# Patient Record
Sex: FEMALE | Race: WHITE | NOT HISPANIC OR LATINO | Employment: UNEMPLOYED | URBAN - METROPOLITAN AREA
[De-identification: names, ages, dates, MRNs, and addresses within clinical notes are randomized per-mention and may not be internally consistent; named-entity substitution may affect disease eponyms.]

---

## 2017-08-16 ENCOUNTER — HOSPITAL ENCOUNTER (OUTPATIENT)
Dept: OTHER | Facility: HOSPITAL | Age: 32
Discharge: HOME | End: 2017-08-16
Attending: FAMILY MEDICINE | Admitting: FAMILY MEDICINE

## 2017-08-28 ENCOUNTER — HOSPITAL ENCOUNTER (OUTPATIENT)
Dept: PHYSICAL THERAPY | Facility: HOSPITAL | Age: 32
Setting detail: RECURRING SERIES
Discharge: HOME | End: 2017-11-29
Attending: FAMILY MEDICINE | Admitting: FAMILY MEDICINE

## 2017-12-20 ENCOUNTER — HOSPITAL ENCOUNTER (OUTPATIENT)
Dept: OTHER | Facility: HOSPITAL | Age: 32
Discharge: HOME | End: 2017-12-20
Attending: FAMILY MEDICINE | Admitting: FAMILY MEDICINE

## 2018-07-18 ENCOUNTER — CONVERSION ENCOUNTER (OUTPATIENT)
Dept: CARDIOLOGY | Facility: CLINIC | Age: 33
End: 2018-07-18

## 2018-07-18 ENCOUNTER — HOSPITAL ENCOUNTER (OUTPATIENT)
Dept: ORTHOPEDIC SURGERY | Facility: CLINIC | Age: 33
Discharge: HOME | End: 2018-07-18
Attending: ORTHOPAEDIC SURGERY | Admitting: ORTHOPAEDIC SURGERY

## 2018-09-17 ENCOUNTER — CONVERSION ENCOUNTER (OUTPATIENT)
Dept: CARDIOLOGY | Facility: CLINIC | Age: 33
End: 2018-09-17

## 2018-09-21 ENCOUNTER — HOSPITAL ENCOUNTER (OUTPATIENT)
Dept: GENERAL RADIOLOGY | Facility: HOSPITAL | Age: 33
Discharge: HOME | End: 2018-09-21
Attending: FAMILY MEDICINE | Admitting: FAMILY MEDICINE

## 2018-10-10 ENCOUNTER — CONVERSION ENCOUNTER (OUTPATIENT)
Dept: CARDIOLOGY | Facility: CLINIC | Age: 33
End: 2018-10-10

## 2018-11-13 ENCOUNTER — CONVERSION ENCOUNTER (OUTPATIENT)
Dept: CARDIOLOGY | Facility: CLINIC | Age: 33
End: 2018-11-13

## 2019-06-04 VITALS
HEART RATE: 85 BPM | SYSTOLIC BLOOD PRESSURE: 137 MMHG | HEART RATE: 68 BPM | DIASTOLIC BLOOD PRESSURE: 82 MMHG | SYSTOLIC BLOOD PRESSURE: 148 MMHG | WEIGHT: 293 LBS | BODY MASS INDEX: 53.92 KG/M2 | SYSTOLIC BLOOD PRESSURE: 128 MMHG | HEART RATE: 71 BPM | BODY MASS INDEX: 53.92 KG/M2 | HEIGHT: 62 IN | WEIGHT: 293 LBS | WEIGHT: 293 LBS | DIASTOLIC BLOOD PRESSURE: 83 MMHG | WEIGHT: 293 LBS | RESPIRATION RATE: 18 BRPM | HEIGHT: 62 IN | HEART RATE: 84 BPM | DIASTOLIC BLOOD PRESSURE: 85 MMHG | SYSTOLIC BLOOD PRESSURE: 163 MMHG | BODY MASS INDEX: 62.22 KG/M2 | DIASTOLIC BLOOD PRESSURE: 107 MMHG | BODY MASS INDEX: 53.92 KG/M2 | HEIGHT: 62 IN

## 2021-08-09 PROBLEM — J45.909 REACTIVE AIRWAY DISEASE: Status: ACTIVE | Noted: 2018-07-18

## 2021-08-09 PROBLEM — M72.2 PLANTAR FASCIITIS: Status: ACTIVE | Noted: 2021-08-09

## 2021-08-09 PROBLEM — I10 HYPERTENSION, BENIGN: Status: ACTIVE | Noted: 2021-08-09

## 2021-08-09 PROBLEM — M54.17 LUMBOSACRAL RADICULOPATHY: Status: ACTIVE | Noted: 2018-07-18

## 2021-08-09 PROBLEM — D64.9 ANEMIA: Status: ACTIVE | Noted: 2021-08-09

## 2021-08-09 PROBLEM — M51.16 LUMBAR DISC HERNIATION WITH RADICULOPATHY: Status: ACTIVE | Noted: 2018-07-18

## 2021-08-09 PROBLEM — K21.9 GASTROESOPHAGEAL REFLUX DISEASE, ESOPHAGITIS PRESENCE NOT SPECIFIED: Status: ACTIVE | Noted: 2021-08-09

## 2021-08-09 PROBLEM — J20.8 ACUTE BRONCHITIS DUE TO OTHER SPECIFIED ORGANISMS: Status: ACTIVE | Noted: 2021-08-09

## 2021-08-09 PROBLEM — R60.0 LOWER EXTREMITY EDEMA: Status: ACTIVE | Noted: 2021-08-09

## 2021-08-09 PROBLEM — J45.998 UNCONTROLLED PERSISTENT ASTHMA: Status: ACTIVE | Noted: 2021-08-09

## 2021-08-09 PROBLEM — R00.2 PALPITATIONS: Status: ACTIVE | Noted: 2018-10-10

## 2021-08-09 PROBLEM — F32.A DEPRESSIVE DISORDER: Status: ACTIVE | Noted: 2021-08-09

## 2021-08-09 PROBLEM — M54.41 CHRONIC RIGHT-SIDED LOW BACK PAIN WITH RIGHT-SIDED SCIATICA: Status: ACTIVE | Noted: 2018-07-18

## 2021-08-09 PROBLEM — M54.6 ACUTE RIGHT-SIDED THORACIC BACK PAIN: Status: ACTIVE | Noted: 2021-08-09

## 2021-08-09 PROBLEM — R12 HEARTBURN: Status: ACTIVE | Noted: 2018-12-10

## 2021-08-09 PROBLEM — M19.90 OSTEOARTHRITIS: Status: ACTIVE | Noted: 2018-07-18

## 2021-08-09 PROBLEM — G89.29 CHRONIC RIGHT-SIDED LOW BACK PAIN WITH RIGHT-SIDED SCIATICA: Status: ACTIVE | Noted: 2018-07-18

## 2021-08-09 PROBLEM — F41.9 ANXIETY: Status: ACTIVE | Noted: 2021-08-09

## 2021-08-10 ENCOUNTER — OFFICE VISIT (OUTPATIENT)
Dept: FAMILY MEDICINE CLINIC | Facility: CLINIC | Age: 36
End: 2021-08-10

## 2021-08-10 VITALS
OXYGEN SATURATION: 98 % | BODY MASS INDEX: 53.92 KG/M2 | HEART RATE: 98 BPM | RESPIRATION RATE: 17 BRPM | WEIGHT: 293 LBS | SYSTOLIC BLOOD PRESSURE: 130 MMHG | DIASTOLIC BLOOD PRESSURE: 80 MMHG | TEMPERATURE: 97.1 F | HEIGHT: 62 IN

## 2021-08-10 DIAGNOSIS — I10 HYPERTENSION, BENIGN: ICD-10-CM

## 2021-08-10 DIAGNOSIS — F33.1 MODERATE EPISODE OF RECURRENT MAJOR DEPRESSIVE DISORDER (HCC): Primary | ICD-10-CM

## 2021-08-10 DIAGNOSIS — F41.9 ANXIETY: ICD-10-CM

## 2021-08-10 DIAGNOSIS — R61 EXCESSIVE SWEATING: ICD-10-CM

## 2021-08-10 DIAGNOSIS — Z13.220 SCREENING FOR HYPERLIPIDEMIA: ICD-10-CM

## 2021-08-10 PROCEDURE — 99214 OFFICE O/P EST MOD 30 MIN: CPT | Performed by: FAMILY MEDICINE

## 2021-08-10 RX ORDER — HYDROCHLOROTHIAZIDE 25 MG/1
25 TABLET ORAL DAILY
COMMUNITY
Start: 2021-07-29 | End: 2021-08-10

## 2021-08-10 RX ORDER — LANOLIN ALCOHOL/MO/W.PET/CERES
1000 CREAM (GRAM) TOPICAL DAILY
COMMUNITY

## 2021-08-10 RX ORDER — BUPROPION HYDROCHLORIDE 300 MG/1
300 TABLET ORAL DAILY
COMMUNITY
Start: 2021-07-29 | End: 2021-08-10

## 2021-08-10 RX ORDER — SERTRALINE HYDROCHLORIDE 100 MG/1
100 TABLET, FILM COATED ORAL DAILY
Qty: 30 TABLET | Refills: 2 | Status: SHIPPED | OUTPATIENT
Start: 2021-08-10 | End: 2021-11-10

## 2021-08-10 RX ORDER — LISINOPRIL 10 MG/1
10 TABLET ORAL DAILY
Qty: 30 TABLET | Refills: 12 | Status: SHIPPED | OUTPATIENT
Start: 2021-08-10 | End: 2022-10-20

## 2021-08-10 NOTE — PATIENT INSTRUCTIONS
Major Depressive Disorder, Adult  Major depressive disorder (MDD) is a mental health condition. It may also be called clinical depression or unipolar depression. MDD causes symptoms of sadness, hopelessness, and loss of interest in things. These symptoms last most of the day, almost every day, for 2 weeks. MDD can also cause physical symptoms. It can interfere with relationships and with everyday activities, such as work, school, and activities that are usually pleasant.  MDD may be mild, moderate, or severe. It may be single-episode MDD, which happens once, or recurrent MDD, which may occur multiple times.  What are the causes?  The exact cause of this condition is not known. MDD is most likely caused by a combination of things, which may include:  · Your personality traits.  · T[place]man or conditioned behaviors or thoughts or feelings that reinforce negativity.  · Any alcohol or substance misuse.  · Long-term (chronic) physical or mental health illness.  · Going through a traumatic experience or major life changes.  What increases the risk?  The following factors may make someone more likely to develop MDD:  · A family history of depression.  · Being a woman.  · Troubled family relationships.  · Abnormally low levels of certain brain chemicals.  · Traumatic or painful events in childhood, especially abuse or loss of a parent.  · A lot of stress from life experiences, such as poor living conditions or discrimination.  · Chronic physical illness or other mental health disorders.  What are the signs or symptoms?  The main symptoms of MDD usually include:  · Constant depressed or irritable mood.  · A loss of interest in things and activities.  Other symptoms include:  · Sleeping or eating too much or too little.  · Unexplained weight gain or weight loss.  · Tiredness or low energy.  · Being agitated, restless, or weak.  · Feeling hopeless, worthless, or guilty.  · Trouble thinking clearly or making  decisions.  · Thoughts of suicide or thoughts of harming others.  · Isolating oneself or avoiding other people or activities.  · Trouble completing tasks, work, or any normal obligations.  Severe symptoms of this condition may include:  · Psychotic depression.This may include false beliefs, or delusions. It may also include seeing, hearing, tasting, smelling, or feeling things that are not real (hallucinations).  · Chronic depression or persistent depressive disorder. This is low-level depression that lasts for at least 2 years.  · Melancholic depression, or feeling extremely sad and hopeless.  · Catatonic depression, which includes trouble speaking and trouble moving.  How is this diagnosed?  This condition may be diagnosed based on:  · Your symptoms.  · Your medical and mental health history. You may be asked questions about your lifestyle, including any drug and alcohol use.  · A physical exam.  · Blood tests to rule out other conditions.  MDD is confirmed if you have the following symptoms most of the day, nearly every day, in a 2-week period:  · Either a depressed mood or loss of interest.  · At least four other MDD symptoms.  How is this treated?  This condition is usually treated by mental health professionals, such as psychologists, psychiatrists, and clinical social workers. You may need more than one type of treatment. Treatment may include:  · Psychotherapy, also called talk therapy or counseling. Types of psychotherapy include:  ? Cognitive behavioral therapy (CBT). This teaches you to recognize unhealthy feelings, thoughts, and behaviors, and replace them with positive thoughts and actions.  ? Interpersonal therapy (IPT). This helps you to improve the way you communicate with others or relate to them.  ? Family therapy. This treatment includes members of your family.  · Medicines to treat anxiety and depression. These medicines help to balance the brain chemicals that affect your emotions.  · Lifestyle  changes. You may be asked to:  ? Limit alcohol use and avoid drug use.  ? Get regular exercise.  ? Get plenty of sleep.  ? Make healthy eating choices.  ? Spend more time outdoors.  · Brain stimulation. This may be done if symptoms are very severe and other treatments have not worked. Examples of this treatment are electroconvulsive therapy and transcranial magnetic stimulation.  Follow these instructions at home:  Activity  · Exercise regularly and spend time outdoors.  · Find activities that you enjoy doing, and make time to do them.  · Find healthy ways to manage stress, such as:  ? Meditation or deep breathing.  ? Spending time in nature.  ? Journaling.  · Return to your normal activities as told by your health care provider. Ask your health care provider what activities are safe for you.  Alcohol and drug use  · If you drink alcohol:  ? Limit how much you use to:  § 0-1 drink a day for women who are not pregnant.  § 0-2 drinks a day for men.  ? Be aware of how much alcohol is in your drink. In the [place] one drink equals one 12 oz bottle of beer (355 mL), one 5 oz glass of wine (148 mL), or one 1½ oz glass of hard liquor (44 mL).  ? Discuss your alcohol use with your health care provider. Alcohol can affect any antidepressant medicines you are taking.  · Discuss any drug use with your health care provider.  General instructions    · Take over-the-counter and prescription medicines only as told by your health care provider.  · Eat a healthy diet and get plenty of sleep.  · Consider joining a support group. Your health care provider may be able to recommend one.  · Keep all follow-up visits as told by your health care provider. This is important.  Where to find more information  · National [place] on Mental Illness: www.nir.org  · [place] of [place] Health: www.nimh.nih.gov  Contact a health care provider if:  · Your symptoms get worse.  · You develop new symptoms.  Get help right away if:  · You  self-harm.  · You have serious thoughts about hurting yourself or others.  · You hallucinate.  If you ever feel like you may hurt yourself or others, or have thoughts about taking your own life, get help right away. Go to your nearest emergency department or:  · Call your local emergency services (911 in the [place]  · Call a suicide crisis helpline, such as the National Suicide Prevention Lifeline at 1-294.465.5636. This is open 24 hours a day in the [place]  · Text the Crisis Text Line at 355893 (in the [place]  Summary  · Major depressive disorder (MDD) is a mental health condition. MDD causes symptoms of sadness, hopelessness, and loss of interest in things. These symptoms last most of the day, almost every day, for 2 weeks.  · The symptoms of MDD can interfere with relationships and with everyday activities.  · Treatments and support are available for people who develop MDD. You may need more than one type of treatment.  · Get help right away if you have serious thoughts about hurting yourself or others.  This information is not intended to replace advice given to you by your health care provider. Make sure you discuss any questions you have with your health care provider.  Document Revised: 11/28/2020 Document Reviewed: 11/28/2020  [employer] Patient Education © 2021 [employer]

## 2021-08-10 NOTE — PROGRESS NOTES
Subjective   Yoselin Gerard is a 36 y.o. female.     Chief Complaint   Patient presents with   • Excessive Sweating   • Anxiety       Anxiety  Presents for follow-up visit. Symptoms include chest pain, depressed mood, excessive worry and nervous/anxious behavior. Patient reports no confusion. Symptoms occur most days. The severity of symptoms is severe and interfering with daily activities. The quality of sleep is poor. Nighttime awakenings: several.     Compliance with medications is %.   Hypertension  This is a chronic problem. Associated symptoms include anxiety and chest pain. Risk factors for coronary artery disease include family history and obesity. Compliance problems include exercise and diet.      I personally reviewed and updated the patient's allergies, medications, problem list, and past medical, surgical, social, and family history. I have reviewed and confirmed the accuracy of the History of Present Illness and Review of Symptoms as documented by the MA/LPN/RN. Josefa Skelton MD    Allergies:  Allergies   Allergen Reactions   • Penicillins Rash and Swelling   • Hydrocodone Other (See Comments)     Numbness       Social History:  Social History     Socioeconomic History   • Marital status:      Spouse name: Not on file   • Number of children: Not on file   • Years of education: Not on file   • Highest education level: Not on file   Tobacco Use   • Smoking status: Never Smoker   • Smokeless tobacco: Never Used   Substance and Sexual Activity   • Alcohol use: Not Currently   • Drug use: Defer   • Sexual activity: Defer       Family History:  Family History   Problem Relation Age of Onset   • Heart disease Mother    • Heart disease Maternal Grandfather        Past Medical History :  Patient Active Problem List   Diagnosis   • Acute bronchitis due to other specified organisms   • Acute right-sided thoracic back pain   • Anemia   • Anxiety   • Reactive airway disease   • Moderate episode of  recurrent major depressive disorder (CMS/HCC)   • Gastroesophageal reflux disease, esophagitis presence not specified   • Heartburn   • Hypertension, benign   • Uncontrolled persistent asthma   • Plantar fasciitis   • Palpitations   • Osteoarthritis   • Lumbosacral radiculopathy   • Lumbar disc herniation with radiculopathy   • Lower extremity edema   • Chronic right-sided low back pain with right-sided sciatica   • Excessive sweating   • Screening for hyperlipidemia       Medication List:    Current Outpatient Medications:   •  ASHWAGANDHA PO, Take 3,000 mg by mouth., Disp: , Rfl:   •  thiamine (VITAMIN B-1) 100 MG tablet  tablet, Take 100 mg by mouth Daily., Disp: , Rfl:   •  vitamin B-12 (CYANOCOBALAMIN) 1000 MCG tablet, Take 1,000 mcg by mouth Daily., Disp: , Rfl:   •  lisinopril (PRINIVIL,ZESTRIL) 10 MG tablet, Take 1 tablet by mouth Daily., Disp: 30 tablet, Rfl: 12  •  sertraline (Zoloft) 100 MG tablet, Take 1 tablet by mouth Daily., Disp: 30 tablet, Rfl: 2    Past Surgical History:  Past Surgical History:   Procedure Laterality Date   • CARPAL TUNNEL RELEASE     •  SECTION      3   • TUBAL ABDOMINAL LIGATION         Review of Systems:  Review of Systems   Constitutional: Negative for activity change and fever.   HENT: Negative for ear pain, rhinorrhea, sinus pressure and voice change.    Eyes: Negative for visual disturbance.   Respiratory: Negative for cough and shortness of breath.    Cardiovascular: Negative for chest pain.   Gastrointestinal: Negative for abdominal pain, diarrhea, nausea and vomiting.   Endocrine: Negative for cold intolerance and heat intolerance.   Genitourinary: Negative for frequency and urgency.   Musculoskeletal: Negative for arthralgias.   Skin: Negative for rash.   Neurological: Negative for syncope.   Hematological: Does not bruise/bleed easily.   Psychiatric/Behavioral: Negative for depressed mood. The patient is not nervous/anxious.        Physical Exam:  Vital  "Signs:  Vital Signs:   /80   Pulse 98   Temp 97.1 °F (36.2 °C)   Resp 17   Ht 157.5 cm (62\")   Wt (!) 154 kg (339 lb 6.4 oz)   SpO2 98%   BMI 62.08 kg/m²     Result Review :                Physical Exam  Vitals reviewed.   Constitutional:       Appearance: Normal appearance. She is well-developed.   HENT:      Head: Normocephalic and atraumatic.   Cardiovascular:      Rate and Rhythm: Normal rate and regular rhythm.      Heart sounds: Normal heart sounds. No murmur heard.   No friction rub. No gallop.    Pulmonary:      Effort: Pulmonary effort is normal.      Breath sounds: Normal breath sounds. No wheezing or rales.   Skin:     General: Skin is warm and dry.   Neurological:      Mental Status: She is alert and oriented to person, place, and time.      Coordination: Coordination normal.      Gait: Gait normal.   Psychiatric:         Behavior: Behavior is cooperative.         Assessment and Plan:  Problems Addressed this Visit        Cardiac and Vasculature    Hypertension, benign     Hypertension is unchanged.  Continue current treatment regimen.  Dietary sodium restriction.  Weight loss.  Blood pressure will be reassessed in 3 months.         Relevant Medications    lisinopril (PRINIVIL,ZESTRIL) 10 MG tablet    Other Relevant Orders    CBC & Differential (Completed)    Comprehensive Metabolic Panel (Completed)    TSH (Completed)    Screening for hyperlipidemia    Relevant Orders    Lipid Panel With / Chol / HDL Ratio (Completed)       Mental Health    Anxiety    Relevant Medications    sertraline (Zoloft) 100 MG tablet    Moderate episode of recurrent major depressive disorder (CMS/HCC) - Primary     Patient's depression is recurrent and is moderate without psychosis. Their depression is currently active and the condition is worsening. This will be reassessed in 4 weeks. F/U as described:patient was prescribed an antidepressant medicine.         Relevant Medications    sertraline (Zoloft) 100 MG " tablet       Symptoms and Signs    Excessive sweating     Check labs           Diagnoses       Codes Comments    Moderate episode of recurrent major depressive disorder (CMS/HCC)    -  Primary ICD-10-CM: F33.1  ICD-9-CM: 296.32     Anxiety     ICD-10-CM: F41.9  ICD-9-CM: 300.00     Excessive sweating     ICD-10-CM: R61  ICD-9-CM: 780.8     Hypertension, benign     ICD-10-CM: I10  ICD-9-CM: 401.1     Screening for hyperlipidemia     ICD-10-CM: Z13.220  ICD-9-CM: V77.91            An After Visit Summary and PPPS were given to the patient.         I wore protective equipment throughout this patient encounter to include mask. Hand hygiene was performed before donning protective equipment and after removal when leaving the room.

## 2021-08-11 LAB
ALBUMIN SERPL-MCNC: 4.2 G/DL (ref 3.8–4.8)
ALBUMIN/GLOB SERPL: 1.4 {RATIO} (ref 1.2–2.2)
ALP SERPL-CCNC: 69 IU/L (ref 48–121)
ALT SERPL-CCNC: 26 IU/L (ref 0–32)
AST SERPL-CCNC: 22 IU/L (ref 0–40)
BASOPHILS # BLD AUTO: 0.1 X10E3/UL (ref 0–0.2)
BASOPHILS NFR BLD AUTO: 1 %
BILIRUB SERPL-MCNC: 0.3 MG/DL (ref 0–1.2)
BUN SERPL-MCNC: 15 MG/DL (ref 6–20)
BUN/CREAT SERPL: 20 (ref 9–23)
CALCIUM SERPL-MCNC: 10.1 MG/DL (ref 8.7–10.2)
CHLORIDE SERPL-SCNC: 99 MMOL/L (ref 96–106)
CHOLEST SERPL-MCNC: 223 MG/DL (ref 100–199)
CHOLEST/HDLC SERPL: 3.4 RATIO (ref 0–4.4)
CO2 SERPL-SCNC: 24 MMOL/L (ref 20–29)
CREAT SERPL-MCNC: 0.76 MG/DL (ref 0.57–1)
EOSINOPHIL # BLD AUTO: 0.2 X10E3/UL (ref 0–0.4)
EOSINOPHIL NFR BLD AUTO: 2 %
ERYTHROCYTE [DISTWIDTH] IN BLOOD BY AUTOMATED COUNT: 14.4 % (ref 11.7–15.4)
GLOBULIN SER CALC-MCNC: 2.9 G/DL (ref 1.5–4.5)
GLUCOSE SERPL-MCNC: 107 MG/DL (ref 65–99)
HCT VFR BLD AUTO: 43.1 % (ref 34–46.6)
HDLC SERPL-MCNC: 66 MG/DL
HGB BLD-MCNC: 13.7 G/DL (ref 11.1–15.9)
IMM GRANULOCYTES # BLD AUTO: 0 X10E3/UL (ref 0–0.1)
IMM GRANULOCYTES NFR BLD AUTO: 0 %
LDLC SERPL CALC-MCNC: 132 MG/DL (ref 0–99)
LYMPHOCYTES # BLD AUTO: 3.4 X10E3/UL (ref 0.7–3.1)
LYMPHOCYTES NFR BLD AUTO: 32 %
MCH RBC QN AUTO: 27.1 PG (ref 26.6–33)
MCHC RBC AUTO-ENTMCNC: 31.8 G/DL (ref 31.5–35.7)
MCV RBC AUTO: 85 FL (ref 79–97)
MONOCYTES # BLD AUTO: 0.8 X10E3/UL (ref 0.1–0.9)
MONOCYTES NFR BLD AUTO: 8 %
NEUTROPHILS # BLD AUTO: 6 X10E3/UL (ref 1.4–7)
NEUTROPHILS NFR BLD AUTO: 57 %
PLATELET # BLD AUTO: 308 X10E3/UL (ref 150–450)
POTASSIUM SERPL-SCNC: 3.8 MMOL/L (ref 3.5–5.2)
PROT SERPL-MCNC: 7.1 G/DL (ref 6–8.5)
RBC # BLD AUTO: 5.05 X10E6/UL (ref 3.77–5.28)
SODIUM SERPL-SCNC: 140 MMOL/L (ref 134–144)
TRIGL SERPL-MCNC: 143 MG/DL (ref 0–149)
TSH SERPL DL<=0.005 MIU/L-ACNC: 5.97 UIU/ML (ref 0.45–4.5)
VLDLC SERPL CALC-MCNC: 25 MG/DL (ref 5–40)
WBC # BLD AUTO: 10.5 X10E3/UL (ref 3.4–10.8)

## 2021-08-24 ENCOUNTER — OFFICE VISIT (OUTPATIENT)
Dept: FAMILY MEDICINE CLINIC | Facility: CLINIC | Age: 36
End: 2021-08-24

## 2021-08-24 VITALS
OXYGEN SATURATION: 95 % | TEMPERATURE: 97.2 F | HEART RATE: 86 BPM | BODY MASS INDEX: 53.92 KG/M2 | WEIGHT: 293 LBS | RESPIRATION RATE: 18 BRPM | HEIGHT: 62 IN | SYSTOLIC BLOOD PRESSURE: 134 MMHG | DIASTOLIC BLOOD PRESSURE: 86 MMHG

## 2021-08-24 DIAGNOSIS — I10 HYPERTENSION, BENIGN: ICD-10-CM

## 2021-08-24 DIAGNOSIS — Z00.00 ENCOUNTER FOR WELLNESS EXAMINATION: Primary | ICD-10-CM

## 2021-08-24 LAB
BILIRUB BLD-MCNC: NEGATIVE MG/DL
CLARITY, POC: CLEAR
COLOR UR: ABNORMAL
GLUCOSE UR STRIP-MCNC: NEGATIVE MG/DL
KETONES UR QL: NEGATIVE
LEUKOCYTE EST, POC: NEGATIVE
NITRITE UR-MCNC: NEGATIVE MG/ML
PH UR: 5 [PH] (ref 5–8)
PROT UR STRIP-MCNC: ABNORMAL MG/DL
RBC # UR STRIP: ABNORMAL /UL
SP GR UR: 1.02 (ref 1–1.03)
UROBILINOGEN UR QL: NORMAL

## 2021-08-24 PROCEDURE — 81003 URINALYSIS AUTO W/O SCOPE: CPT | Performed by: FAMILY MEDICINE

## 2021-08-24 PROCEDURE — 99395 PREV VISIT EST AGE 18-39: CPT | Performed by: FAMILY MEDICINE

## 2021-08-24 NOTE — PROGRESS NOTES
"  Chief Complaint   Patient presents with   • Annual Exam   • Hypertension         Subjective   Yoselin Gerard is a 36 y.o. female here for a Well Woman Visit. Energy level is described as poor and she is sleeping poorly. She sleeps 4 hours nightly. Last pap was patient does not remember but is scheduled for 08/31/2021 with Dr Carroll. Contraception: tubal. Patient exercises irregularly. Nutrition is described as in general, an \"unhealthy\" diet. Her   libido is normal. She reports that she does perform monthly self breast exam.      Hypertension  This is a chronic problem. The current episode started more than 1 year ago. The problem has been waxing and waning since onset. The problem is controlled. Pertinent negatives include no blurred vision, chest pain, headaches, malaise/fatigue, palpitations, shortness of breath or sweats. There are no associated agents to hypertension. Risk factors for coronary artery disease include obesity. Past treatments include nothing. Current antihypertension treatment includes ACE inhibitors. The current treatment provides moderate improvement.        I personally reviewed and updated the patient's allergies, medications, problem list, and past medical, surgical, social, and family history.     Allergies:  Allergies   Allergen Reactions   • Penicillins Rash and Swelling   • Hydrocodone Other (See Comments)     Numbness       Social History:  Social History     Socioeconomic History   • Marital status:      Spouse name: Not on file   • Number of children: Not on file   • Years of education: Not on file   • Highest education level: Not on file   Tobacco Use   • Smoking status: Never Smoker   • Smokeless tobacco: Never Used   Substance and Sexual Activity   • Alcohol use: Not Currently   • Drug use: Defer   • Sexual activity: Defer       Family History:  Family History   Problem Relation Age of Onset   • Heart disease Mother    • Heart disease Maternal Grandfather        Past " Medical History :  Active Ambulatory Problems     Diagnosis Date Noted   • Acute bronchitis due to other specified organisms 2021   • Acute right-sided thoracic back pain 2021   • Anemia 2021   • Anxiety 2021   • Reactive airway disease 2018   • Moderate episode of recurrent major depressive disorder (CMS/HCC) 2021   • Gastroesophageal reflux disease, esophagitis presence not specified 2021   • Heartburn 12/10/2018   • Hypertension, benign 2021   • Uncontrolled persistent asthma 2021   • Plantar fasciitis 2021   • Palpitations 10/10/2018   • Osteoarthritis 2018   • Lumbosacral radiculopathy 2018   • Lumbar disc herniation with radiculopathy 2018   • Lower extremity edema 2021   • Chronic right-sided low back pain with right-sided sciatica 2018   • Excessive sweating 08/10/2021   • Encounter for wellness examination 08/10/2021     Resolved Ambulatory Problems     Diagnosis Date Noted   • No Resolved Ambulatory Problems     Past Medical History:   Diagnosis Date   • Hypertension    • PTSD (post-traumatic stress disorder)        Medication List:    Current Outpatient Medications:   •  ASHWAGANDHA PO, Take 3,000 mg by mouth., Disp: , Rfl:   •  lisinopril (PRINIVIL,ZESTRIL) 10 MG tablet, Take 1 tablet by mouth Daily., Disp: 30 tablet, Rfl: 12  •  sertraline (Zoloft) 100 MG tablet, Take 1 tablet by mouth Daily., Disp: 30 tablet, Rfl: 2  •  thiamine (VITAMIN B-1) 100 MG tablet  tablet, Take 100 mg by mouth Daily., Disp: , Rfl:   •  vitamin B-12 (CYANOCOBALAMIN) 1000 MCG tablet, Take 1,000 mcg by mouth Daily., Disp: , Rfl:     Past Surgical History:  Past Surgical History:   Procedure Laterality Date   • CARPAL TUNNEL RELEASE     •  SECTION      3   • TUBAL ABDOMINAL LIGATION         Depression Screen:   PHQ-2/PHQ-9 Depression Screening 8/10/2021   Little interest or pleasure in doing things 1   Feeling down, depressed, or  "hopeless 1   Trouble falling or staying asleep, or sleeping too much 3   Feeling tired or having little energy 3   Poor appetite or overeating 3   Feeling bad about yourself - or that you are a failure or have let yourself or your family down 1   Trouble concentrating on things, such as reading the newspaper or watching television 3   Moving or speaking so slowly that other people could have noticed. Or the opposite - being so fidgety or restless that you have been moving around a lot more than usual 2   Thoughts that you would be better off dead, or of hurting yourself in some way 0   Total Score 17   If you checked off any problems, how difficult have these problems made it for you to do your work, take care of things at home, or get along with other people? Very difficult       Fall Risk Screen:  YING Fall Risk Assessment has not been completed.    Review Of Systems:  Review of Systems   Constitutional: Negative for activity change, fever and malaise/fatigue.   HENT: Negative for ear pain, rhinorrhea, sinus pressure and voice change.    Eyes: Negative for blurred vision and visual disturbance.   Respiratory: Negative for cough and shortness of breath.    Cardiovascular: Negative for chest pain and palpitations.   Gastrointestinal: Negative for abdominal pain, diarrhea, nausea and vomiting.   Endocrine: Negative for cold intolerance and heat intolerance.   Genitourinary: Negative for frequency and urgency.   Musculoskeletal: Negative for arthralgias.   Skin: Negative for rash.   Neurological: Negative for syncope.   Hematological: Does not bruise/bleed easily.   Psychiatric/Behavioral: Negative for depressed mood. The patient is not nervous/anxious.        Physical Exam:  Vital Signs:  Visit Vitals  /86   Pulse 86   Temp 97.2 °F (36.2 °C)   Resp 18   Ht 157.5 cm (62\")   Wt (!) 150 kg (330 lb 6.4 oz)   SpO2 95%   BMI 60.43 kg/m²       Physical Exam  Vitals and nursing note reviewed.   Constitutional:       " General: She is not in acute distress.     Appearance: She is well-developed. She is not diaphoretic.   HENT:      Head: Normocephalic and atraumatic.      Right Ear: External ear normal.      Left Ear: External ear normal.      Nose: Nose normal.      Mouth/Throat:      Pharynx: No oropharyngeal exudate.   Eyes:      General: No scleral icterus.        Right eye: No discharge.         Left eye: No discharge.      Conjunctiva/sclera: Conjunctivae normal.      Pupils: Pupils are equal, round, and reactive to light.   Neck:      Thyroid: No thyromegaly.      Trachea: No tracheal deviation.   Cardiovascular:      Rate and Rhythm: Normal rate and regular rhythm.      Heart sounds: Normal heart sounds. No murmur heard.   No friction rub. No gallop.    Pulmonary:      Effort: Pulmonary effort is normal. No respiratory distress.      Breath sounds: Normal breath sounds. No stridor. No wheezing or rales.   Abdominal:      General: Bowel sounds are normal. There is no distension.      Palpations: Abdomen is soft. There is no mass.      Tenderness: There is no abdominal tenderness. There is no guarding or rebound.   Musculoskeletal:         General: No tenderness or deformity. Normal range of motion.      Cervical back: Normal range of motion and neck supple.   Lymphadenopathy:      Cervical: No cervical adenopathy.   Skin:     General: Skin is warm and dry.      Capillary Refill: Capillary refill takes less than 2 seconds.      Coloration: Skin is not pale.      Findings: No erythema or rash.   Neurological:      Mental Status: She is alert and oriented to person, place, and time.      Cranial Nerves: No cranial nerve deficit.      Sensory: No sensory deficit.      Motor: No tremor, atrophy or abnormal muscle tone.      Coordination: Coordination normal.      Gait: Gait normal.      Deep Tendon Reflexes: Reflexes are normal and symmetric. Reflexes normal.   Psychiatric:         Behavior: Behavior normal.         Thought  Content: Thought content normal.         Cognition and Memory: Memory is not impaired. She does not exhibit impaired recent memory or impaired remote memory.         Judgment: Judgment normal.           Assessment and Plan:  Problem List Items Addressed This Visit        Cardiac and Vasculature    Hypertension, benign    Current Assessment & Plan     Hypertension is unchanged.  Continue current treatment regimen.  Dietary sodium restriction.  Weight loss.  Blood pressure will be reassessed in 3 months.            Health Encounters    Encounter for wellness examination - Primary    Relevant Orders    POCT urinalysis dipstick, multipro (Completed)          An After Visit Summary and PPPS were given to the patient.       Discussed injury prevention, diet and exercise, safe sexual practices, and screening for common diseases. Encouraged use of sunscreen and seatbelts. Discussed timing of  cervical cancer screening. Encouraged monthly self-breast exams, yearly clinical breast exams, and discussed timing of mammograms. Avoidance of tobacco encouraged. Limitation or avoidance of alcohol encouraged. Recommend yearly dental and eye exams. Also discussed monitoring of blood pressure, lipids.     I wore protective equipment throughout this patient encounter to include mask. Hand hygiene was performed before donning protective equipment and after removal when leaving the room.

## 2021-11-10 DIAGNOSIS — F33.1 MODERATE EPISODE OF RECURRENT MAJOR DEPRESSIVE DISORDER (HCC): ICD-10-CM

## 2021-11-10 DIAGNOSIS — F41.9 ANXIETY: ICD-10-CM

## 2021-11-10 RX ORDER — SERTRALINE HYDROCHLORIDE 100 MG/1
TABLET, FILM COATED ORAL
Qty: 30 TABLET | Refills: 2 | Status: SHIPPED | OUTPATIENT
Start: 2021-11-10 | End: 2022-03-11

## 2021-11-29 ENCOUNTER — TELEPHONE (OUTPATIENT)
Dept: FAMILY MEDICINE CLINIC | Facility: CLINIC | Age: 36
End: 2021-11-29

## 2021-12-07 ENCOUNTER — OFFICE VISIT (OUTPATIENT)
Dept: FAMILY MEDICINE CLINIC | Facility: CLINIC | Age: 36
End: 2021-12-07

## 2021-12-07 VITALS
SYSTOLIC BLOOD PRESSURE: 124 MMHG | BODY MASS INDEX: 53.92 KG/M2 | RESPIRATION RATE: 18 BRPM | TEMPERATURE: 97.3 F | WEIGHT: 293 LBS | HEIGHT: 62 IN | OXYGEN SATURATION: 97 % | HEART RATE: 104 BPM | DIASTOLIC BLOOD PRESSURE: 72 MMHG

## 2021-12-07 DIAGNOSIS — I10 HYPERTENSION, BENIGN: Primary | ICD-10-CM

## 2021-12-07 PROCEDURE — 99212 OFFICE O/P EST SF 10 MIN: CPT | Performed by: FAMILY MEDICINE

## 2021-12-07 NOTE — PROGRESS NOTES
Subjective   Yoselin Gerard is a 36 y.o. female. Presents to [facility]    Chief Complaint   Patient presents with   • Hypertension       Hypertension  This is a chronic problem. The current episode started more than 1 year ago. The problem has been waxing and waning since onset. The problem is controlled. Pertinent negatives include no blurred vision, chest pain, headaches, malaise/fatigue, neck pain, palpitations, shortness of breath or sweats. There are no associated agents to hypertension. Risk factors for coronary artery disease include obesity. Past treatments include nothing. Current antihypertension treatment includes ACE inhibitors. The current treatment provides moderate improvement.        I personally reviewed and updated the patient's allergies, medications, problem list, and past medical, surgical, social, and family history. I have reviewed and confirmed the accuracy of the History of Present Illness and Review of Symptoms as documented by the MA/LPN/RN. Josefa Skelton MD    Allergies:  Allergies   Allergen Reactions   • Penicillins Rash and Swelling   • Hydrocodone Other (See Comments)     Numbness       Social History:  Social History     Socioeconomic History   • Marital status:    Tobacco Use   • Smoking status: Never Smoker   • Smokeless tobacco: Never Used   Substance and Sexual Activity   • Alcohol use: Not Currently   • Drug use: Defer   • Sexual activity: Defer       Family History:  Family History   Problem Relation Age of Onset   • Heart disease Mother    • Heart disease Maternal Grandfather        Past Medical History :  Patient Active Problem List   Diagnosis   • Acute bronchitis due to other specified organisms   • Acute right-sided thoracic back pain   • Anemia   • Anxiety   • Reactive airway disease   • Moderate episode of recurrent major depressive disorder (HCC)   • Gastroesophageal reflux disease, esophagitis presence not specified   • Heartburn   •  Hypertension, benign   • Uncontrolled persistent asthma   • Plantar fasciitis   • Palpitations   • Osteoarthritis   • Lumbosacral radiculopathy   • Lumbar disc herniation with radiculopathy   • Lower extremity edema   • Chronic right-sided low back pain with right-sided sciatica   • Excessive sweating   • Encounter for wellness examination       Medication List:    Current Outpatient Medications:   •  ASHWAGANDHA PO, Take 3,000 mg by mouth., Disp: , Rfl:   •  lisinopril (PRINIVIL,ZESTRIL) 10 MG tablet, Take 1 tablet by mouth Daily., Disp: 30 tablet, Rfl: 12  •  sertraline (ZOLOFT) 100 MG tablet, TAKE 1 TABLET BY MOUTH EVERY DAY, Disp: 30 tablet, Rfl: 2  •  thiamine (VITAMIN B-1) 100 MG tablet  tablet, Take 100 mg by mouth Daily., Disp: , Rfl:   •  vitamin B-12 (CYANOCOBALAMIN) 1000 MCG tablet, Take 1,000 mcg by mouth Daily., Disp: , Rfl:     Past Surgical History:  Past Surgical History:   Procedure Laterality Date   • CARPAL TUNNEL RELEASE     •  SECTION      3   • TUBAL ABDOMINAL LIGATION         Review of Systems:  Review of Systems   Constitutional: Negative for activity change, fever and malaise/fatigue.   HENT: Negative for ear pain, rhinorrhea, sinus pressure and voice change.    Eyes: Negative for blurred vision and visual disturbance.   Respiratory: Negative for cough and shortness of breath.    Cardiovascular: Negative for chest pain and palpitations.   Gastrointestinal: Negative for abdominal pain, diarrhea, nausea and vomiting.   Endocrine: Negative for cold intolerance and heat intolerance.   Genitourinary: Negative for frequency and urgency.   Musculoskeletal: Negative for arthralgias and neck pain.   Skin: Negative for rash.   Neurological: Negative for syncope.   Hematological: Does not bruise/bleed easily.   Psychiatric/Behavioral: Negative for depressed mood. The patient is not nervous/anxious.        Physical Exam:  Vital Signs:  Vital Signs:   /72   Pulse 104   Temp 97.3 °F (36.3  "°C)   Resp 18   Ht 157.5 cm (62\")   Wt (!) 149 kg (328 lb)   SpO2 97%   BMI 59.99 kg/m²     Result Review :                Physical Exam  Vitals reviewed.   Constitutional:       Appearance: Normal appearance. She is well-developed.   HENT:      Head: Normocephalic and atraumatic.   Eyes:      General:         Right eye: No discharge.         Left eye: No discharge.   Cardiovascular:      Rate and Rhythm: Normal rate and regular rhythm.      Heart sounds: Normal heart sounds. No murmur heard.  No friction rub. No gallop.    Pulmonary:      Effort: Pulmonary effort is normal. No respiratory distress.      Breath sounds: Normal breath sounds. No wheezing or rales.   Skin:     General: Skin is warm and dry.      Findings: No rash.   Neurological:      Mental Status: She is alert and oriented to person, place, and time.      Coordination: Coordination normal.      Gait: Gait normal.   Psychiatric:         Behavior: Behavior is cooperative.         Assessment and Plan:  Problems Addressed this Visit        Cardiac and Vasculature    Hypertension, benign - Primary     Hypertension is improving with treatment.  Continue current treatment regimen.  Dietary sodium restriction.  Weight loss.  Regular aerobic exercise.  Continue current medications.  Blood pressure will be reassessed in 3 months.           Diagnoses       Codes Comments    Hypertension, benign    -  Primary ICD-10-CM: I10  ICD-9-CM: 401.1            An After Visit Summary and PPPS were given to the patient.       I wore protective equipment throughout this patient encounter to include mask. Hand hygiene was performed before donning protective equipment and after removal when leaving the room.    "

## 2021-12-08 LAB
ALBUMIN SERPL-MCNC: 4.2 G/DL (ref 3.8–4.8)
ALBUMIN/GLOB SERPL: 1.4 {RATIO} (ref 1.2–2.2)
ALP SERPL-CCNC: 68 IU/L (ref 44–121)
ALT SERPL-CCNC: 16 IU/L (ref 0–32)
AST SERPL-CCNC: 15 IU/L (ref 0–40)
BILIRUB SERPL-MCNC: <0.2 MG/DL (ref 0–1.2)
BUN SERPL-MCNC: 10 MG/DL (ref 6–20)
BUN/CREAT SERPL: 13 (ref 9–23)
CALCIUM SERPL-MCNC: 9.7 MG/DL (ref 8.7–10.2)
CHLORIDE SERPL-SCNC: 103 MMOL/L (ref 96–106)
CO2 SERPL-SCNC: 24 MMOL/L (ref 20–29)
CREAT SERPL-MCNC: 0.76 MG/DL (ref 0.57–1)
GLOBULIN SER CALC-MCNC: 2.9 G/DL (ref 1.5–4.5)
GLUCOSE SERPL-MCNC: 132 MG/DL (ref 65–99)
POTASSIUM SERPL-SCNC: 4.4 MMOL/L (ref 3.5–5.2)
PROT SERPL-MCNC: 7.1 G/DL (ref 6–8.5)
SODIUM SERPL-SCNC: 140 MMOL/L (ref 134–144)

## 2021-12-10 ENCOUNTER — TELEPHONE (OUTPATIENT)
Dept: FAMILY MEDICINE CLINIC | Facility: CLINIC | Age: 36
End: 2021-12-10

## 2022-03-11 DIAGNOSIS — F41.9 ANXIETY: ICD-10-CM

## 2022-03-11 DIAGNOSIS — F33.1 MODERATE EPISODE OF RECURRENT MAJOR DEPRESSIVE DISORDER: ICD-10-CM

## 2022-03-11 RX ORDER — SERTRALINE HYDROCHLORIDE 100 MG/1
TABLET, FILM COATED ORAL
Qty: 30 TABLET | Refills: 12 | Status: SHIPPED | OUTPATIENT
Start: 2022-03-11 | End: 2022-06-20

## 2022-03-29 ENCOUNTER — OFFICE VISIT (OUTPATIENT)
Dept: FAMILY MEDICINE CLINIC | Facility: CLINIC | Age: 37
End: 2022-03-29

## 2022-03-29 VITALS
DIASTOLIC BLOOD PRESSURE: 80 MMHG | SYSTOLIC BLOOD PRESSURE: 118 MMHG | TEMPERATURE: 97.5 F | OXYGEN SATURATION: 96 % | BODY MASS INDEX: 53.92 KG/M2 | HEART RATE: 101 BPM | RESPIRATION RATE: 12 BRPM | HEIGHT: 62 IN | WEIGHT: 293 LBS

## 2022-03-29 DIAGNOSIS — I10 HYPERTENSION, BENIGN: ICD-10-CM

## 2022-03-29 DIAGNOSIS — R23.2 HOT FLASHES: ICD-10-CM

## 2022-03-29 DIAGNOSIS — R05.9 COUGH: ICD-10-CM

## 2022-03-29 DIAGNOSIS — J01.00 ACUTE NON-RECURRENT MAXILLARY SINUSITIS: ICD-10-CM

## 2022-03-29 DIAGNOSIS — H92.03 OTALGIA OF BOTH EARS: Primary | ICD-10-CM

## 2022-03-29 PROBLEM — B96.89 ACUTE BACTERIAL BRONCHITIS: Status: ACTIVE | Noted: 2021-08-09

## 2022-03-29 PROCEDURE — 99214 OFFICE O/P EST MOD 30 MIN: CPT | Performed by: FAMILY MEDICINE

## 2022-03-29 RX ORDER — PREDNISONE 1 MG/1
5 TABLET ORAL DAILY
Qty: 45 TABLET | Refills: 0 | Status: SHIPPED | OUTPATIENT
Start: 2022-03-29 | End: 2022-06-20

## 2022-03-29 RX ORDER — AZITHROMYCIN 250 MG/1
TABLET, FILM COATED ORAL
Qty: 6 TABLET | Refills: 0 | Status: SHIPPED | OUTPATIENT
Start: 2022-03-29 | End: 2022-06-20

## 2022-03-29 NOTE — PROGRESS NOTES
Subjective   Yoselin Gerard is a 37 y.o. female. Presents to [facility]    Chief Complaint   Patient presents with   • Cough   • Sore Throat   • Earache       Pt reports she has been seen for her excessive sweating, however it has gotten a lot worse to where it is affecting her work, and social life.     Pt complains of bilateral ear pain, matted shut eyes in the morning, sore throat, yellow phlem and cough that has been ongoing for the last week. Pt states no other symptoms have been present at this time.     Cough  This is a new problem. The current episode started 1 to 4 weeks ago. The problem has been unchanged. The problem occurs hourly. The cough is productive of sputum. Associated symptoms include ear pain. Associated symptoms comments: Eye drainage. Exacerbated by: smokers. She has tried OTC cough suppressant for the symptoms. The treatment provided mild relief.   Earache   There is pain in both (left is worse) ears. This is a new problem. The current episode started 1 to 4 weeks ago. The problem occurs every few hours. The problem has been unchanged. There has been no fever. The pain is mild. Associated symptoms include coughing. She has tried acetaminophen for the symptoms. The treatment provided mild relief.        I personally reviewed and updated the patient's allergies, medications, problem list, and past medical, surgical, social, and family history. I have reviewed and confirmed the accuracy of the History of Present Illness and Review of Symptoms as documented by the MA/LPN/RN. Josefa Skelton MD    Allergies:  Allergies   Allergen Reactions   • Penicillins Rash and Swelling   • Hydrocodone Other (See Comments)     Numbness       Social History:  Social History     Socioeconomic History   • Marital status:    Tobacco Use   • Smoking status: Never Smoker   • Smokeless tobacco: Never Used   Substance and Sexual Activity   • Alcohol use: Not Currently   • Drug use: Defer   •  Sexual activity: Defer       Family History:  Family History   Problem Relation Age of Onset   • Heart disease Mother    • Heart disease Maternal Grandfather        Past Medical History :  Patient Active Problem List   Diagnosis   • Acute bacterial bronchitis   • Acute right-sided thoracic back pain   • Anemia   • Anxiety   • Reactive airway disease   • Moderate episode of recurrent major depressive disorder (HCC)   • Gastroesophageal reflux disease, esophagitis presence not specified   • Heartburn   • Hypertension, benign   • Uncontrolled persistent asthma   • Plantar fasciitis   • Palpitations   • Osteoarthritis   • Lumbosacral radiculopathy   • Lumbar disc herniation with radiculopathy   • Lower extremity edema   • Chronic right-sided low back pain with right-sided sciatica   • Excessive sweating   • Encounter for wellness examination   • Otalgia of both ears   • Hot flashes   • Cough   • Acute non-recurrent maxillary sinusitis       Medication List:    Current Outpatient Medications:   •  ASHWAGANDHA PO, Take 3,000 mg by mouth., Disp: , Rfl:   •  lisinopril (PRINIVIL,ZESTRIL) 10 MG tablet, Take 1 tablet by mouth Daily., Disp: 30 tablet, Rfl: 12  •  sertraline (ZOLOFT) 100 MG tablet, TAKE 1 TABLET BY MOUTH EVERY DAY, Disp: 30 tablet, Rfl: 12  •  thiamine (VITAMIN B-1) 100 MG tablet  tablet, Take 100 mg by mouth Daily., Disp: , Rfl:   •  vitamin B-12 (CYANOCOBALAMIN) 1000 MCG tablet, Take 1,000 mcg by mouth Daily., Disp: , Rfl:   •  azithromycin (ZITHROMAX) 250 MG tablet, Take 2 tablets the first day, then 1 tablet daily for 4 days., Disp: 6 tablet, Rfl: 0  •  predniSONE (DELTASONE) 5 MG tablet, Take 1 tablet by mouth Daily. 40mg x 3 days, 20mg x 3 days, 10mg x 3 days, 5mg x 3 days, Disp: 45 tablet, Rfl: 0    Past Surgical History:  Past Surgical History:   Procedure Laterality Date   • CARPAL TUNNEL RELEASE     •  SECTION      3   • TUBAL ABDOMINAL LIGATION         Review of Systems:  Review of Systems  "  HENT: Positive for ear pain.    Respiratory: Positive for cough.        Physical Exam:  Vital Signs:  Vital Signs:   /80   Pulse 101   Temp 97.5 °F (36.4 °C)   Resp 12   Ht 157.5 cm (62.01\")   Wt (!) 151 kg (333 lb 12.8 oz)   SpO2 96%   BMI 61.04 kg/m²     Result Review :                Physical Exam  Vitals reviewed.   Constitutional:       Appearance: Normal appearance. She is well-developed.   HENT:      Head: Normocephalic and atraumatic.      Right Ear: Tympanic membrane and external ear normal. No decreased hearing noted. No tenderness. No middle ear effusion. Tympanic membrane is not injected, erythematous, retracted or bulging.      Left Ear: Tympanic membrane and external ear normal. No decreased hearing noted. No tenderness.  No middle ear effusion. Tympanic membrane is not injected, erythematous, retracted or bulging.      Nose: Nose normal. No rhinorrhea.      Mouth/Throat:      Pharynx: No oropharyngeal exudate or posterior oropharyngeal erythema.   Eyes:      General:         Right eye: No discharge.         Left eye: No discharge.   Cardiovascular:      Rate and Rhythm: Normal rate and regular rhythm.      Heart sounds: Normal heart sounds. No murmur heard.    No friction rub. No gallop.   Pulmonary:      Effort: Pulmonary effort is normal. No respiratory distress.      Breath sounds: Normal breath sounds. No wheezing or rales.   Lymphadenopathy:      Cervical: No cervical adenopathy.   Skin:     General: Skin is warm and dry.      Findings: No rash.   Neurological:      Mental Status: She is alert and oriented to person, place, and time.      Coordination: Coordination normal.      Gait: Gait normal.   Psychiatric:         Behavior: Behavior is cooperative.         Assessment and Plan:  Problems Addressed this Visit        Cardiac and Vasculature    Hypertension, benign     Hypertension is unchanged.  Continue current treatment regimen.  Blood pressure will be reassessed in 3 " months.              ENT    Otalgia of both ears - Primary    Acute non-recurrent maxillary sinusitis     increase fluids, tylenol for fever, motrin for pain. Humidifier to help with congestion and to sleep at night. Dicussed OTC meds, gargle with warm salt water. If there is recurrent fever, shortness of breath, lethargy, advised to come in to the office or go to the ER.             Relevant Medications    azithromycin (ZITHROMAX) 250 MG tablet    predniSONE (DELTASONE) 5 MG tablet       Pulmonary and Pneumonias    Cough     From drainage             Relevant Medications    predniSONE (DELTASONE) 5 MG tablet       Symptoms and Signs    Hot flashes    Relevant Orders    Comprehensive Metabolic Panel    TSH    Follicle Stimulating Hormone    Luteinizing Hormone      Diagnoses       Codes Comments    Otalgia of both ears    -  Primary ICD-10-CM: H92.03  ICD-9-CM: 388.70     Hypertension, benign     ICD-10-CM: I10  ICD-9-CM: 401.1     Hot flashes     ICD-10-CM: R23.2  ICD-9-CM: 782.62     Cough     ICD-10-CM: R05.9  ICD-9-CM: 786.2     Acute non-recurrent maxillary sinusitis     ICD-10-CM: J01.00  ICD-9-CM: 461.0            An After Visit Summary and PPPS were given to the patient.       I wore protective equipment throughout this patient encounter to include mask. Hand hygiene was performed before donning protective equipment and after removal when leaving the room.

## 2022-03-30 LAB
ALBUMIN SERPL-MCNC: 4.1 G/DL (ref 3.8–4.8)
ALBUMIN/GLOB SERPL: 1.5 {RATIO} (ref 1.2–2.2)
ALP SERPL-CCNC: 73 IU/L (ref 44–121)
ALT SERPL-CCNC: 12 IU/L (ref 0–32)
AST SERPL-CCNC: 14 IU/L (ref 0–40)
BILIRUB SERPL-MCNC: 0.3 MG/DL (ref 0–1.2)
BUN SERPL-MCNC: 13 MG/DL (ref 6–20)
BUN/CREAT SERPL: 20 (ref 9–23)
CALCIUM SERPL-MCNC: 9.2 MG/DL (ref 8.7–10.2)
CHLORIDE SERPL-SCNC: 101 MMOL/L (ref 96–106)
CO2 SERPL-SCNC: 22 MMOL/L (ref 20–29)
CREAT SERPL-MCNC: 0.66 MG/DL (ref 0.57–1)
EGFRCR SERPLBLD CKD-EPI 2021: 117 ML/MIN/1.73
FSH SERPL-ACNC: 2.4 MIU/ML
GLOBULIN SER CALC-MCNC: 2.8 G/DL (ref 1.5–4.5)
GLUCOSE SERPL-MCNC: 96 MG/DL (ref 65–99)
LH SERPL-ACNC: 2.3 MIU/ML
POTASSIUM SERPL-SCNC: 4.5 MMOL/L (ref 3.5–5.2)
PROT SERPL-MCNC: 6.9 G/DL (ref 6–8.5)
SODIUM SERPL-SCNC: 138 MMOL/L (ref 134–144)
TSH SERPL DL<=0.005 MIU/L-ACNC: 2.35 UIU/ML (ref 0.45–4.5)

## 2022-06-17 NOTE — PROGRESS NOTES
Subjective   Yoselin Gerard is a 37 y.o. female. Presents to [facility]    Chief Complaint   Patient presents with   • Generalized Body Aches   • Weight Gain   • Migraine       Migraine  Headache pattern:  Headache sometimes there, sometimes not at all  Initial event:  None  Recent changes:  Headaches come more often than they used to  Frequency:  2 to 3 per week  Providers seen:  None  Number of ER visits for headache:  0  Did ER treatment alleviate headache symptoms?:  No  Number of hospitalizations for headaches:  0  Did hospitalization alleviate headache symptoms?:  No  ADL impact frequency:  2 to 3 per week  Time of day symptoms are worse:  Upon waking up  Do headaches wake patient from sleep?: No    Days of the week symptoms are worse:  No specific day of the week  Season symptoms are worse:  No particular season  Quality:  Squeezing and tightness  Laterality:  Both sides at the same time  Location:  Back of head, front/forehead and top of head  Other factors:  Being on the computer at work  Changes in thinking and mood:  Fatigue and irritability  Changes in vision:  None  Bilateral symptoms:  None  Unilateral symptoms:  None  Stomach/GI changes:  None  Changes in sensation:  Lightheadedness  Abortive medication timing of administration:  When the pain is mild  Abortive medications tried:  Excedrin migraine/tension  Preventative medications tried:  None  Arthritis  Pertinent negatives include no diarrhea, fever or rash.   Obesity  Pertinent negatives include no abdominal pain, arthralgias, chest pain, coughing, fever, nausea, rash or vomiting.      She stopped the zoloft because she felt it was making her sweat. It helped stopping it. She just went through a break up.   Her bones ache in her back and in her legs. She is working 10 - 13 hour days. She has since gained weight from working this much.     I personally reviewed and updated the patient's allergies, medications, problem list, and  past medical, surgical, social, and family history. I have reviewed and confirmed the accuracy of the History of Present Illness and Review of Symptoms as documented by the MA/LPN/RN. Josefa Skelton MD    Allergies:  Allergies   Allergen Reactions   • Penicillins Rash and Swelling   • Hydrocodone Other (See Comments)     Numbness       Social History:  Social History     Socioeconomic History   • Marital status:    Tobacco Use   • Smoking status: Never Smoker   • Smokeless tobacco: Never Used   Substance and Sexual Activity   • Alcohol use: Not Currently   • Drug use: Defer   • Sexual activity: Defer       Family History:  Family History   Problem Relation Age of Onset   • Heart disease Mother    • Heart disease Maternal Grandfather        Past Medical History :  Patient Active Problem List   Diagnosis   • Acute right-sided thoracic back pain   • Anemia   • Anxiety   • Moderate episode of recurrent major depressive disorder (HCC)   • Gastroesophageal reflux disease, esophagitis presence not specified   • Heartburn   • Hypertension, benign   • Moderate persistent asthma without complication   • Plantar fasciitis   • Palpitations   • Osteoarthritis   • Lumbosacral radiculopathy   • Lumbar disc herniation with radiculopathy   • Lower extremity edema   • Chronic right-sided low back pain with right-sided sciatica   • Excessive sweating   • Encounter for wellness examination   • Hot flashes   • Weight gain   • Screening for hyperlipidemia   • Migraine with aura and without status migrainosus, not intractable       Medication List:    Current Outpatient Medications:   •  ASHWAGANDHA PO, Take 3,000 mg by mouth., Disp: , Rfl:   •  lisinopril (PRINIVIL,ZESTRIL) 10 MG tablet, Take 1 tablet by mouth Daily., Disp: 30 tablet, Rfl: 12  •  thiamine (VITAMIN B-1) 100 MG tablet  tablet, Take 100 mg by mouth Daily., Disp: , Rfl:   •  vitamin B-12 (CYANOCOBALAMIN) 1000 MCG tablet, Take 1,000 mcg by mouth Daily., Disp: , Rfl:  "  •  topiramate (TOPAMAX) 25 MG tablet, Take 1 tablet by mouth Daily., Disp: 30 tablet, Rfl: 1    Past Surgical History:  Past Surgical History:   Procedure Laterality Date   • CARPAL TUNNEL RELEASE     •  SECTION      3   • TUBAL ABDOMINAL LIGATION         Review of Systems:  Review of Systems   Constitutional: Negative for activity change and fever.   HENT: Negative for ear pain, rhinorrhea, sinus pressure and voice change.    Eyes: Negative for visual disturbance.   Respiratory: Negative for cough and shortness of breath.    Cardiovascular: Negative for chest pain.   Gastrointestinal: Negative for abdominal pain, diarrhea, nausea and vomiting.   Endocrine: Negative for cold intolerance and heat intolerance.   Genitourinary: Negative for frequency and urgency.   Musculoskeletal: Positive for arthritis. Negative for arthralgias.   Skin: Negative for rash.   Neurological: Negative for syncope.   Hematological: Does not bruise/bleed easily.   Psychiatric/Behavioral: Negative for depressed mood. The patient is not nervous/anxious.        Physical Exam:  Vital Signs:  Vital Signs:   /64 (BP Location: Right arm, Patient Position: Sitting, Cuff Size: Large Adult)   Pulse 86   Temp 99.1 °F (37.3 °C)   Resp 18   Ht 157.5 cm (62.01\")   Wt (!) 160 kg (353 lb)   SpO2 98%   BMI 64.54 kg/m²     Result Review :                Physical Exam  Vitals reviewed.   Constitutional:       Appearance: Normal appearance. She is well-developed.   HENT:      Head: Normocephalic and atraumatic.   Eyes:      General:         Right eye: No discharge.         Left eye: No discharge.   Cardiovascular:      Rate and Rhythm: Normal rate and regular rhythm.      Heart sounds: Normal heart sounds. No murmur heard.    No friction rub. No gallop.   Pulmonary:      Effort: Pulmonary effort is normal. No respiratory distress.      Breath sounds: Normal breath sounds. No wheezing or rales.   Skin:     General: Skin is warm and dry. "      Findings: No rash.   Neurological:      General: No focal deficit present.      Mental Status: She is alert and oriented to person, place, and time.      Cranial Nerves: No cranial nerve deficit.      Motor: No weakness.      Coordination: Coordination normal.      Gait: Gait normal.      Deep Tendon Reflexes: Reflexes normal.   Psychiatric:         Behavior: Behavior is cooperative.         Assessment and Plan:  Problems Addressed this Visit        Cardiac and Vasculature    Hypertension, benign     Hypertension is unchanged.  Continue current treatment regimen.  Dietary sodium restriction.  Weight loss.  Blood pressure will be reassessed in 3 months.           Relevant Orders    CBC & Differential (Completed)    Comprehensive Metabolic Panel (Completed)    TSH (Completed)    Screening for hyperlipidemia    Relevant Orders    Lipid Panel With / Chol / HDL Ratio (Completed)       Endocrine and Metabolic    Weight gain     Check labs              Mental Health    Anxiety    Moderate episode of recurrent major depressive disorder (HCC) - Primary     Patient's depression is recurrent and is moderate without psychosis. Their depression is currently active  and the condition is unchanged. This will be reassessed at the next regular appointment. F/U as described:patient will continue current medication therapy.              Neuro    Migraine with aura and without status migrainosus, not intractable     Worse    Dicussed if there is loss of vision, confusion, weakness or numbness in an extremity, dropping of an eyelid or one side of the face, severe pain, intractable vomiting, go to the ER               Relevant Medications    topiramate (TOPAMAX) 25 MG tablet       Symptoms and Signs    Excessive sweating      Diagnoses       Codes Comments    Moderate episode of recurrent major depressive disorder (HCC)    -  Primary ICD-10-CM: F33.1  ICD-9-CM: 296.32     Hypertension, benign     ICD-10-CM: I10  ICD-9-CM: 401.1      Excessive sweating     ICD-10-CM: R61  ICD-9-CM: 780.8     Anxiety     ICD-10-CM: F41.9  ICD-9-CM: 300.00     Weight gain     ICD-10-CM: R63.5  ICD-9-CM: 783.1     Screening for hyperlipidemia     ICD-10-CM: Z13.220  ICD-9-CM: V77.91     Migraine with aura and without status migrainosus, not intractable     ICD-10-CM: G43.109  ICD-9-CM: 346.00            Class 3 Severe Obesity (BMI >=40). Obesity-related health conditions include the following: HTN Obesity is worsening. BMI is is above average; BMI management plan is completed. We discussed low calorie, low carb based diet program, portion control and increasing exercise.      An After Visit Summary and PPPS were given to the patient.       I wore protective equipment throughout this patient encounter to include mask and eyewear. Hand hygiene was performed before donning protective equipment and after removal when leaving the room.

## 2022-06-20 ENCOUNTER — OFFICE VISIT (OUTPATIENT)
Dept: FAMILY MEDICINE CLINIC | Facility: CLINIC | Age: 37
End: 2022-06-20

## 2022-06-20 VITALS
HEIGHT: 62 IN | HEART RATE: 86 BPM | TEMPERATURE: 99.1 F | DIASTOLIC BLOOD PRESSURE: 64 MMHG | WEIGHT: 293 LBS | RESPIRATION RATE: 18 BRPM | SYSTOLIC BLOOD PRESSURE: 126 MMHG | OXYGEN SATURATION: 98 % | BODY MASS INDEX: 53.92 KG/M2

## 2022-06-20 DIAGNOSIS — Z13.220 SCREENING FOR HYPERLIPIDEMIA: ICD-10-CM

## 2022-06-20 DIAGNOSIS — R63.5 WEIGHT GAIN: ICD-10-CM

## 2022-06-20 DIAGNOSIS — G43.109 MIGRAINE WITH AURA AND WITHOUT STATUS MIGRAINOSUS, NOT INTRACTABLE: ICD-10-CM

## 2022-06-20 DIAGNOSIS — F41.9 ANXIETY: ICD-10-CM

## 2022-06-20 DIAGNOSIS — R61 EXCESSIVE SWEATING: ICD-10-CM

## 2022-06-20 DIAGNOSIS — I10 HYPERTENSION, BENIGN: ICD-10-CM

## 2022-06-20 DIAGNOSIS — F33.1 MODERATE EPISODE OF RECURRENT MAJOR DEPRESSIVE DISORDER: Primary | ICD-10-CM

## 2022-06-20 PROBLEM — R05.9 COUGH: Status: RESOLVED | Noted: 2022-03-29 | Resolved: 2022-06-20

## 2022-06-20 PROBLEM — J45.909 REACTIVE AIRWAY DISEASE: Status: RESOLVED | Noted: 2018-07-18 | Resolved: 2022-06-20

## 2022-06-20 PROBLEM — J01.00 ACUTE NON-RECURRENT MAXILLARY SINUSITIS: Status: RESOLVED | Noted: 2022-03-29 | Resolved: 2022-06-20

## 2022-06-20 PROBLEM — B96.89 ACUTE BACTERIAL BRONCHITIS: Status: RESOLVED | Noted: 2021-08-09 | Resolved: 2022-06-20

## 2022-06-20 PROBLEM — G43.009 MIGRAINE WITHOUT AURA AND WITHOUT STATUS MIGRAINOSUS, NOT INTRACTABLE: Status: ACTIVE | Noted: 2022-06-20

## 2022-06-20 PROBLEM — J45.40 MODERATE PERSISTENT ASTHMA WITHOUT COMPLICATION: Status: ACTIVE | Noted: 2021-08-09

## 2022-06-20 PROBLEM — H92.03 OTALGIA OF BOTH EARS: Status: RESOLVED | Noted: 2022-03-29 | Resolved: 2022-06-20

## 2022-06-20 PROBLEM — J20.8 ACUTE BACTERIAL BRONCHITIS: Status: RESOLVED | Noted: 2021-08-09 | Resolved: 2022-06-20

## 2022-06-20 PROCEDURE — 99214 OFFICE O/P EST MOD 30 MIN: CPT | Performed by: FAMILY MEDICINE

## 2022-06-20 RX ORDER — TOPIRAMATE 25 MG/1
25 TABLET ORAL DAILY
Qty: 30 TABLET | Refills: 1 | Status: SHIPPED | OUTPATIENT
Start: 2022-06-20 | End: 2022-07-18 | Stop reason: SDUPTHER

## 2022-06-20 NOTE — ASSESSMENT & PLAN NOTE
Worse    Dicussed if there is loss of vision, confusion, weakness or numbness in an extremity, dropping of an eyelid or one side of the face, severe pain, intractable vomiting, go to the ER

## 2022-06-22 LAB
ALBUMIN SERPL-MCNC: 3.9 G/DL (ref 3.8–4.8)
ALBUMIN/GLOB SERPL: 1.3 {RATIO} (ref 1.2–2.2)
ALP SERPL-CCNC: 71 IU/L (ref 44–121)
ALT SERPL-CCNC: 20 IU/L (ref 0–32)
AST SERPL-CCNC: 19 IU/L (ref 0–40)
BASOPHILS # BLD AUTO: 0.1 X10E3/UL (ref 0–0.2)
BASOPHILS NFR BLD AUTO: 1 %
BILIRUB SERPL-MCNC: 0.2 MG/DL (ref 0–1.2)
BUN SERPL-MCNC: 9 MG/DL (ref 6–20)
BUN/CREAT SERPL: 15 (ref 9–23)
CALCIUM SERPL-MCNC: 9.2 MG/DL (ref 8.7–10.2)
CHLORIDE SERPL-SCNC: 99 MMOL/L (ref 96–106)
CHOLEST SERPL-MCNC: 209 MG/DL (ref 100–199)
CHOLEST/HDLC SERPL: 3.7 RATIO (ref 0–4.4)
CO2 SERPL-SCNC: 27 MMOL/L (ref 20–29)
CREAT SERPL-MCNC: 0.62 MG/DL (ref 0.57–1)
EGFRCR SERPLBLD CKD-EPI 2021: 118 ML/MIN/1.73
EOSINOPHIL # BLD AUTO: 0.2 X10E3/UL (ref 0–0.4)
EOSINOPHIL NFR BLD AUTO: 2 %
ERYTHROCYTE [DISTWIDTH] IN BLOOD BY AUTOMATED COUNT: 14.7 % (ref 11.7–15.4)
GLOBULIN SER CALC-MCNC: 2.9 G/DL (ref 1.5–4.5)
GLUCOSE SERPL-MCNC: 102 MG/DL (ref 65–99)
HCT VFR BLD AUTO: 41 % (ref 34–46.6)
HDLC SERPL-MCNC: 57 MG/DL
HGB BLD-MCNC: 12.4 G/DL (ref 11.1–15.9)
IMM GRANULOCYTES # BLD AUTO: 0 X10E3/UL (ref 0–0.1)
IMM GRANULOCYTES NFR BLD AUTO: 0 %
LDLC SERPL CALC-MCNC: 129 MG/DL (ref 0–99)
LYMPHOCYTES # BLD AUTO: 2.6 X10E3/UL (ref 0.7–3.1)
LYMPHOCYTES NFR BLD AUTO: 29 %
MCH RBC QN AUTO: 24.7 PG (ref 26.6–33)
MCHC RBC AUTO-ENTMCNC: 30.2 G/DL (ref 31.5–35.7)
MCV RBC AUTO: 82 FL (ref 79–97)
MONOCYTES # BLD AUTO: 0.5 X10E3/UL (ref 0.1–0.9)
MONOCYTES NFR BLD AUTO: 5 %
NEUTROPHILS # BLD AUTO: 5.5 X10E3/UL (ref 1.4–7)
NEUTROPHILS NFR BLD AUTO: 63 %
PLATELET # BLD AUTO: 370 X10E3/UL (ref 150–450)
POTASSIUM SERPL-SCNC: 4.5 MMOL/L (ref 3.5–5.2)
PROT SERPL-MCNC: 6.8 G/DL (ref 6–8.5)
RBC # BLD AUTO: 5.03 X10E6/UL (ref 3.77–5.28)
SODIUM SERPL-SCNC: 137 MMOL/L (ref 134–144)
TRIGL SERPL-MCNC: 130 MG/DL (ref 0–149)
TSH SERPL DL<=0.005 MIU/L-ACNC: 1.39 UIU/ML (ref 0.45–4.5)
VLDLC SERPL CALC-MCNC: 23 MG/DL (ref 5–40)
WBC # BLD AUTO: 8.8 X10E3/UL (ref 3.4–10.8)

## 2022-06-24 ENCOUNTER — TELEPHONE (OUTPATIENT)
Dept: FAMILY MEDICINE CLINIC | Facility: CLINIC | Age: 37
End: 2022-06-24

## 2022-06-26 NOTE — ASSESSMENT & PLAN NOTE
Patient's depression is recurrent and is moderate without psychosis. Their depression is currently active  and the condition is unchanged. This will be reassessed at the next regular appointment. F/U as described:patient will continue current medication therapy.

## 2022-07-15 NOTE — PROGRESS NOTES
Subjective   Yoselin Gerard is a 37 y.o. female. Presents to [facility]    Chief Complaint   Patient presents with   • Migraine       Migraine  Headache pattern:  Headache sometimes there, sometimes not at all  Initial event:  None  Recent changes:  Headaches come less often than they used to  Frequency:  2 to 3 per week  Providers seen:  None  Number of ER visits for headache:  0  Did ER treatment alleviate headache symptoms?:  No  Number of hospitalizations for headaches:  0  Did hospitalization alleviate headache symptoms?:  No  Time of day symptoms are worse:  Upon waking up  Do headaches wake patient from sleep?: No    Days of the week symptoms are worse:  No specific day of the week  Season symptoms are worse:  No particular season  Quality:  Dull  Laterality:  Both sides at the same time  Location:  Back of head, front/forehead and top of head  Aggravating factors:  Stress  Other factors:  Being on the computer at work  Changes in thinking and mood:  Fatigue  Changes in vision:  None  Bilateral symptoms:  None  Unilateral symptoms:  None  Stomach/GI changes:  None  Changes in sensation:  Lightheadedness  Abortive medication timing of administration:  When the pain is mild  Abortive medications tried:  Excedrin migraine/tension  Preventive treatments: topamax.  Vitamins and supplements tried:  None  Alternative treatments tried:  None     Her headaches are better. The medication is helping but she is still having the headaches. She would like to go up on the medication    I personally reviewed and updated the patient's allergies, medications, problem list, and past medical, surgical, social, and family history. I have reviewed and confirmed the accuracy of the History of Present Illness and Review of Symptoms as documented by the MA/LPN/RN. Josefa Skelton MD    Allergies:  Allergies   Allergen Reactions   • Penicillins Rash and Swelling   • Hydrocodone Other (See Comments)     Numbness        Social History:  Social History     Socioeconomic History   • Marital status:    Tobacco Use   • Smoking status: Never Smoker   • Smokeless tobacco: Never Used   Substance and Sexual Activity   • Alcohol use: Not Currently   • Drug use: Defer   • Sexual activity: Defer       Family History:  Family History   Problem Relation Age of Onset   • Heart disease Mother    • Heart disease Maternal Grandfather        Past Medical History :  Patient Active Problem List   Diagnosis   • Acute right-sided thoracic back pain   • Anemia   • Anxiety   • Moderate episode of recurrent major depressive disorder (HCC)   • Gastroesophageal reflux disease, esophagitis presence not specified   • Heartburn   • Hypertension, benign   • Moderate persistent asthma without complication   • Plantar fasciitis   • Palpitations   • Osteoarthritis   • Lumbosacral radiculopathy   • Lumbar disc herniation with radiculopathy   • Lower extremity edema   • Chronic right-sided low back pain with right-sided sciatica   • Excessive sweating   • Encounter for wellness examination   • Hot flashes   • Weight gain   • Screening for hyperlipidemia   • Migraine with aura and without status migrainosus, not intractable   • Morbid (severe) obesity due to excess calories (HCC)       Medication List:    Current Outpatient Medications:   •  ASHWAGANDHA PO, Take 3,000 mg by mouth., Disp: , Rfl:   •  lisinopril (PRINIVIL,ZESTRIL) 10 MG tablet, Take 1 tablet by mouth Daily., Disp: 30 tablet, Rfl: 12  •  thiamine (VITAMIN B-1) 100 MG tablet  tablet, Take 100 mg by mouth Daily., Disp: , Rfl:   •  topiramate (TOPAMAX) 25 MG tablet, Take 1 tablet by mouth 2 (Two) Times a Day., Disp: 60 tablet, Rfl: 12  •  vitamin B-12 (CYANOCOBALAMIN) 1000 MCG tablet, Take 1,000 mcg by mouth Daily., Disp: , Rfl:     Past Surgical History:  Past Surgical History:   Procedure Laterality Date   • CARPAL TUNNEL RELEASE     •  SECTION      3   • TUBAL ABDOMINAL LIGATION    "      Review of Systems:  Review of Systems   Constitutional: Negative for activity change and fever.   HENT: Negative for ear pain, rhinorrhea, sinus pressure and voice change.    Eyes: Negative for visual disturbance.   Respiratory: Negative for cough and shortness of breath.    Cardiovascular: Negative for chest pain.   Gastrointestinal: Negative for abdominal pain, diarrhea, nausea and vomiting.   Endocrine: Negative for cold intolerance and heat intolerance.   Genitourinary: Negative for frequency and urgency.   Musculoskeletal: Positive for arthritis. Negative for arthralgias.   Skin: Negative for rash.   Neurological: Negative for syncope.   Hematological: Does not bruise/bleed easily.   Psychiatric/Behavioral: Negative for depressed mood. The patient is not nervous/anxious.        Physical Exam:  Vital Signs:  Vital Signs:   /80 (BP Location: Right arm, Patient Position: Sitting, Cuff Size: Large Adult)   Pulse 83   Temp 98.2 °F (36.8 °C) (Temporal)   Resp 18   Ht 157.5 cm (62\")   Wt (!) 159 kg (350 lb 6.4 oz)   SpO2 98% Comment: room air  BMI 64.09 kg/m²     Result Review :                Physical Exam  Vitals reviewed.   Constitutional:       Appearance: Normal appearance. She is well-developed.   HENT:      Head: Normocephalic and atraumatic.      Right Ear: Tympanic membrane normal.      Left Ear: Tympanic membrane normal.      Mouth/Throat:      Mouth: Mucous membranes are moist.   Eyes:      General:         Right eye: No discharge.         Left eye: No discharge.      Extraocular Movements: Extraocular movements intact.      Pupils: Pupils are equal, round, and reactive to light.   Cardiovascular:      Rate and Rhythm: Normal rate and regular rhythm.      Heart sounds: Normal heart sounds. No murmur heard.    No friction rub. No gallop.   Pulmonary:      Effort: Pulmonary effort is normal. No respiratory distress.      Breath sounds: Normal breath sounds. No wheezing or rales.   Skin:     " General: Skin is warm and dry.      Findings: No rash.   Neurological:      General: No focal deficit present.      Mental Status: She is alert and oriented to person, place, and time.      Motor: No weakness.      Coordination: Coordination normal.      Gait: Gait normal.      Deep Tendon Reflexes: Reflexes normal.   Psychiatric:         Behavior: Behavior is cooperative.         Assessment and Plan:  Problems Addressed this Visit        Endocrine and Metabolic    Morbid (severe) obesity due to excess calories (HCC)     Patient's (Body mass index is 64.09 kg/m².) indicates that they are morbidly obese (BMI > 40 or > 35 with obesity - related health condition) with health conditions that include hypertension . Weight is unchanged. BMI is is above average; BMI management plan is completed. We discussed portion control and increasing exercise.               Neuro    Migraine with aura and without status migrainosus, not intractable - Primary     Headaches are worsening.  Advised to keep a headache diary.  Medication changes per orders.    Dicussed if there is loss of vision, confusion, weakness or numbness in an extremity, dropping of an eyelid or one side of the face, severe pain, intractable vomiting, go to the ER             Relevant Medications    topiramate (TOPAMAX) 25 MG tablet      Diagnoses       Codes Comments    Migraine with aura and without status migrainosus, not intractable    -  Primary ICD-10-CM: G43.109  ICD-9-CM: 346.00     Morbid (severe) obesity due to excess calories (HCC)     ICD-10-CM: E66.01  ICD-9-CM: 278.01            Class 3 Severe Obesity (BMI >=40). Obesity-related health conditions include the following: none. Obesity is improving with lifestyle modifications. BMI is is above average; BMI management plan is completed. We discussed low calorie, low carb based diet program, portion control and increasing exercise.      An After Visit Summary and PPPS were given to the patient.       I wore  protective equipment throughout this patient encounter to include mask and eyewear. Hand hygiene was performed before donning protective equipment and after removal when leaving the room.

## 2022-07-18 ENCOUNTER — OFFICE VISIT (OUTPATIENT)
Dept: FAMILY MEDICINE CLINIC | Facility: CLINIC | Age: 37
End: 2022-07-18

## 2022-07-18 VITALS
HEART RATE: 83 BPM | HEIGHT: 62 IN | OXYGEN SATURATION: 98 % | RESPIRATION RATE: 18 BRPM | DIASTOLIC BLOOD PRESSURE: 80 MMHG | SYSTOLIC BLOOD PRESSURE: 122 MMHG | WEIGHT: 293 LBS | BODY MASS INDEX: 53.92 KG/M2 | TEMPERATURE: 98.2 F

## 2022-07-18 DIAGNOSIS — E66.01 MORBID (SEVERE) OBESITY DUE TO EXCESS CALORIES: ICD-10-CM

## 2022-07-18 DIAGNOSIS — G43.109 MIGRAINE WITH AURA AND WITHOUT STATUS MIGRAINOSUS, NOT INTRACTABLE: Primary | ICD-10-CM

## 2022-07-18 PROBLEM — E66.2 OBESITY WITH ALVEOLAR HYPOVENTILATION AND BODY MASS INDEX (BMI) OF 40 OR GREATER (HCC): Status: ACTIVE | Noted: 2022-07-18

## 2022-07-18 PROCEDURE — 99214 OFFICE O/P EST MOD 30 MIN: CPT | Performed by: FAMILY MEDICINE

## 2022-07-18 RX ORDER — TOPIRAMATE 25 MG/1
25 TABLET ORAL 2 TIMES DAILY
Qty: 60 TABLET | Refills: 12 | Status: SHIPPED | OUTPATIENT
Start: 2022-07-18

## 2022-07-18 NOTE — ASSESSMENT & PLAN NOTE
Patient's (Body mass index is 64.09 kg/m².) indicates that they are morbidly obese (BMI > 40 or > 35 with obesity - related health condition) with health conditions that include hypertension . Weight is unchanged. BMI is is above average; BMI management plan is completed. We discussed portion control and increasing exercise.

## 2022-07-24 NOTE — ASSESSMENT & PLAN NOTE
Headaches are worsening.  Advised to keep a headache diary.  Medication changes per orders.    Dicussed if there is loss of vision, confusion, weakness or numbness in an extremity, dropping of an eyelid or one side of the face, severe pain, intractable vomiting, go to the ER

## 2022-10-20 DIAGNOSIS — I10 HYPERTENSION, BENIGN: ICD-10-CM

## 2022-10-20 RX ORDER — LISINOPRIL 10 MG/1
TABLET ORAL
Qty: 90 TABLET | Refills: 3 | Status: SHIPPED | OUTPATIENT
Start: 2022-10-20 | End: 2023-01-25

## 2023-01-23 NOTE — PROGRESS NOTES
Subjective   Yoselin Gerard is a 37 y.o. female. Presents to [facility]    Chief Complaint   Patient presents with   • Hypertension   • Skin Lesion       History of Present Illness  Skin Lesion:   Patient complains of a skin lesion of the neck. The lesion has been present for unknown. Lesion has changed in color recently. Symptoms associated with the lesion are: increasing thickness, darkening color, none. Patient denies pain, none.  Hypertension  This is a chronic problem. The current episode started more than 1 year ago. Associated symptoms include sweats. Pertinent negatives include no chest pain or malaise/fatigue. Risk factors for coronary artery disease include obesity. Current antihypertension treatment includes ACE inhibitors. The current treatment provides moderate improvement.      She is not on her medication.     I personally reviewed and updated the patient's allergies, medications, problem list, and past medical, surgical, social, and family history. I have reviewed and confirmed the accuracy of the History of Present Illness and Review of Symptoms as documented by the MA/LPN/RN. Josefa Skelton MD    Allergies:  Allergies   Allergen Reactions   • Penicillins Rash and Swelling   • Hydrocodone Other (See Comments)     Numbness       Social History:  Social History     Socioeconomic History   • Marital status:    Tobacco Use   • Smoking status: Never   • Smokeless tobacco: Never   Substance and Sexual Activity   • Alcohol use: Not Currently   • Drug use: Defer   • Sexual activity: Defer       Family History:  Family History   Problem Relation Age of Onset   • Heart disease Mother    • Heart disease Maternal Grandfather        Past Medical History :  Patient Active Problem List   Diagnosis   • Acute right-sided thoracic back pain   • Anemia   • Anxiety   • Moderate episode of recurrent major depressive disorder (HCC)   • Gastroesophageal reflux disease, esophagitis presence not  specified   • Heartburn   • Hypertension, benign   • Moderate persistent asthma without complication   • Plantar fasciitis   • Palpitations   • Osteoarthritis   • Lumbosacral radiculopathy   • Lumbar disc herniation with radiculopathy   • Lower extremity edema   • Chronic right-sided low back pain with right-sided sciatica   • Excessive sweating   • Encounter for wellness examination   • Hot flashes   • Migraine with aura and without status migrainosus, not intractable   • Class 3 severe obesity due to excess calories with serious comorbidity and body mass index (BMI) of 60.0 to 69.9 in adult (HCC)   • Skin lesion of neck   • Mixed hyperlipidemia       Medication List:    Current Outpatient Medications:   •  ASHWAGANDHA PO, Take 3,000 mg by mouth., Disp: , Rfl:   •  thiamine (VITAMIN B-1) 100 MG tablet  tablet, Take 100 mg by mouth Daily., Disp: , Rfl:   •  topiramate (TOPAMAX) 25 MG tablet, Take 1 tablet by mouth 2 (Two) Times a Day., Disp: 60 tablet, Rfl: 12  •  vitamin B-12 (CYANOCOBALAMIN) 1000 MCG tablet, Take 1,000 mcg by mouth Daily., Disp: , Rfl:   •  losartan (COZAAR) 25 MG tablet, Take 0.5 tablets by mouth Daily., Disp: 30 tablet, Rfl: 1    Past Surgical History:  Past Surgical History:   Procedure Laterality Date   • CARPAL TUNNEL RELEASE     •  SECTION      3   • TUBAL ABDOMINAL LIGATION           Physical Exam:      Vital Signs:    Vitals:    23 1645   BP: 130/80   Pulse:    Resp:    Temp:    SpO2:         Wt Readings from Last 3 Encounters:   23 (!) 158 kg (348 lb 3.2 oz)   22 (!) 159 kg (350 lb 6.4 oz)   22 (!) 160 kg (353 lb)       Result Review :                Physical Exam  Vitals reviewed.   Constitutional:       Appearance: Normal appearance. She is well-developed.   HENT:      Head: Normocephalic and atraumatic.   Eyes:      General:         Right eye: No discharge.         Left eye: No discharge.   Cardiovascular:      Rate and Rhythm: Normal rate and regular  rhythm.      Heart sounds: Normal heart sounds. No murmur heard.    No friction rub. No gallop.   Pulmonary:      Effort: Pulmonary effort is normal. No respiratory distress.      Breath sounds: Normal breath sounds. No wheezing or rales.   Skin:     General: Skin is warm and dry.      Findings: No rash.      Comments: Right lateral neck with nevi   Neurological:      Mental Status: She is alert and oriented to person, place, and time.      Coordination: Coordination normal.      Gait: Gait normal.   Psychiatric:         Behavior: Behavior is cooperative.         Assessment and Plan:  Problems Addressed this Visit        Cardiac and Vasculature    Hypertension, benign - Primary     Hypertension is worse without medication but not as elevated as before.  Medication changes per orders.  decrease losartan to 1/2 tablet  Blood pressure will be reassessed in 3 months.         Relevant Medications    losartan (COZAAR) 25 MG tablet    Mixed hyperlipidemia       Endocrine and Metabolic    Class 3 severe obesity due to excess calories with serious comorbidity and body mass index (BMI) of 60.0 to 69.9 in adult ([facility])     Patient's (Body mass index is 63.69 kg/m².) indicates that they are obese (BMI >30) with health conditions that include hypertension . Weight is improving with lifestyle modifications. BMI is is above average; BMI management plan is completed. We discussed portion control and increasing exercise.             Skin    Skin lesion of neck     Schedule removal        Diagnoses       Codes Comments    Hypertension, benign    -  Primary ICD-10-CM: I10  ICD-9-CM: 401.1     Skin lesion of neck     ICD-10-CM: L98.9  ICD-9-CM: 709.9     Class 3 severe obesity due to excess calories with serious comorbidity and body mass index (BMI) of 60.0 to 69.9 in adult ([facility])     ICD-10-CM: E66.01, Z68.44  ICD-9-CM: 278.01, V85.44     Mixed hyperlipidemia     ICD-10-CM: E78.2  ICD-9-CM: 272.2            Class 3 Severe Obesity (BMI  >=40). Obesity-related health conditions include the following: hypertension. Obesity is unchanged. BMI is is above average; BMI management plan is completed. We discussed low calorie, low carb based diet program, portion control and increasing exercise.      An After Visit Summary and PPPS were given to the patient.       I wore protective equipment throughout this patient encounter to include mask and eyewear. Hand hygiene was performed before donning protective equipment and after removal when leaving the room.

## 2023-01-25 ENCOUNTER — OFFICE VISIT (OUTPATIENT)
Dept: FAMILY MEDICINE CLINIC | Facility: CLINIC | Age: 38
End: 2023-01-25
Payer: COMMERCIAL

## 2023-01-25 VITALS
DIASTOLIC BLOOD PRESSURE: 80 MMHG | RESPIRATION RATE: 19 BRPM | BODY MASS INDEX: 53.92 KG/M2 | HEART RATE: 93 BPM | OXYGEN SATURATION: 97 % | WEIGHT: 293 LBS | SYSTOLIC BLOOD PRESSURE: 130 MMHG | HEIGHT: 62 IN | TEMPERATURE: 97.3 F

## 2023-01-25 DIAGNOSIS — E78.2 MIXED HYPERLIPIDEMIA: ICD-10-CM

## 2023-01-25 DIAGNOSIS — I10 HYPERTENSION, BENIGN: Primary | ICD-10-CM

## 2023-01-25 DIAGNOSIS — E66.01 CLASS 3 SEVERE OBESITY DUE TO EXCESS CALORIES WITH SERIOUS COMORBIDITY AND BODY MASS INDEX (BMI) OF 60.0 TO 69.9 IN ADULT: ICD-10-CM

## 2023-01-25 DIAGNOSIS — L98.9 SKIN LESION OF NECK: ICD-10-CM

## 2023-01-25 PROBLEM — R63.5 WEIGHT GAIN: Status: RESOLVED | Noted: 2022-06-20 | Resolved: 2023-01-25

## 2023-01-25 PROBLEM — Z13.220 SCREENING FOR HYPERLIPIDEMIA: Status: RESOLVED | Noted: 2022-06-20 | Resolved: 2023-01-25

## 2023-01-25 PROCEDURE — 99214 OFFICE O/P EST MOD 30 MIN: CPT | Performed by: FAMILY MEDICINE

## 2023-01-25 RX ORDER — LOSARTAN POTASSIUM 25 MG/1
12.5 TABLET ORAL DAILY
Qty: 30 TABLET | Refills: 1 | Status: SHIPPED | OUTPATIENT
Start: 2023-01-25 | End: 2023-02-27 | Stop reason: SDUPTHER

## 2023-01-25 NOTE — ASSESSMENT & PLAN NOTE
Patient's (Body mass index is 63.69 kg/m².) indicates that they are obese (BMI >30) with health conditions that include hypertension . Weight is improving with lifestyle modifications. BMI is is above average; BMI management plan is completed. We discussed portion control and increasing exercise.

## 2023-01-30 PROBLEM — E66.813 CLASS 3 SEVERE OBESITY DUE TO EXCESS CALORIES WITH SERIOUS COMORBIDITY AND BODY MASS INDEX (BMI) OF 60.0 TO 69.9 IN ADULT: Status: ACTIVE | Noted: 2022-07-18

## 2023-01-31 NOTE — ASSESSMENT & PLAN NOTE
Hypertension is worse without medication but not as elevated as before.  Medication changes per orders.  decrease losartan to 1/2 tablet  Blood pressure will be reassessed in 3 months.

## 2023-02-22 NOTE — PROGRESS NOTES
"  Chief Complaint   Patient presents with   • Annual Exam   • Hypertension   • Hyperlipidemia         Subjective   Yoselin Gerard is a 37 y.o. female here for a Annual Visit.     Last Physical Exam: 08/24/2022 Previous physical was performed by  Josefa Skelton MD  General Health:good   Contraceptive History:none  Nutrition:in general, a \"healthy\" diet    Exercise Level:irregularly  Sleep:fairly poor  Hours of Sleep:5  Libido:fair  Self Skin Exam: monthly  Monthly Breast Self Exam:Performs monthly self breast exam    Pap Smear:  Last Completed Pap Smear     This patient has no relevant Health Maintenance data.       Findings on last pap: 09/2022 with Dr. Carroll}    Mammogram:   Last Completed Mammogram     This patient has no relevant Health Maintenance data.       she has never had a mammogram    Bone Dexa scan: None    Colonoscopy:   Last Completed Colonoscopy     This patient has no relevant Health Maintenance data.       none         I personally reviewed and updated the patient's allergies, medications, problem list, and past medical, surgical, social, and family history.     Allergies:  Allergies   Allergen Reactions   • Penicillins Rash and Swelling   • Hydrocodone Other (See Comments)     Numbness       Social History:  Social History     Socioeconomic History   • Marital status:    Tobacco Use   • Smoking status: Never   • Smokeless tobacco: Never   Substance and Sexual Activity   • Alcohol use: Not Currently   • Drug use: Defer   • Sexual activity: Defer       Family History:  Family History   Problem Relation Age of Onset   • Heart disease Mother    • Heart disease Maternal Grandfather        Past Medical History :  Active Ambulatory Problems     Diagnosis Date Noted   • Acute right-sided thoracic back pain 08/09/2021   • Anemia 08/09/2021   • Anxiety 08/09/2021   • Moderate episode of recurrent major depressive disorder (HCC) 08/09/2021   • Gastroesophageal reflux disease, esophagitis presence " not specified 2021   • Heartburn 12/10/2018   • Hypertension, benign 2021   • Moderate persistent asthma without complication 2021   • Plantar fasciitis 2021   • Palpitations 10/10/2018   • Osteoarthritis 2018   • Lumbosacral radiculopathy 2018   • Lumbar disc herniation with radiculopathy 2018   • Lower extremity edema 2021   • Chronic right-sided low back pain with right-sided sciatica 2018   • Excessive sweating 08/10/2021   • Encounter for wellness examination 08/10/2021   • Hot flashes 2022   • Migraine with aura and without status migrainosus, not intractable 2022   • Class 3 severe obesity due to excess calories with serious comorbidity and body mass index (BMI) of 60.0 to 69.9 in adult (HCC) 2022   • Skin lesion of neck 2023   • Mixed hyperlipidemia 2023     Resolved Ambulatory Problems     Diagnosis Date Noted   • Acute bacterial bronchitis 2021   • Reactive airway disease 2018   • Otalgia of both ears 2022   • Cough 2022   • Acute non-recurrent maxillary sinusitis 2022   • Weight gain 2022   • Screening for hyperlipidemia 2022     Past Medical History:   Diagnosis Date   • Hypertension    • PTSD (post-traumatic stress disorder)        Medication List:    Current Outpatient Medications:   •  ASHWAGANDHA PO, Take 3,000 mg by mouth., Disp: , Rfl:   •  losartan (COZAAR) 25 MG tablet, Take 0.5 tablets by mouth Daily., Disp: 30 tablet, Rfl: 6  •  thiamine (VITAMIN B-1) 100 MG tablet  tablet, Take 100 mg by mouth Daily., Disp: , Rfl:   •  topiramate (TOPAMAX) 25 MG tablet, Take 1 tablet by mouth 2 (Two) Times a Day., Disp: 60 tablet, Rfl: 12  •  vitamin B-12 (CYANOCOBALAMIN) 1000 MCG tablet, Take 1,000 mcg by mouth Daily., Disp: , Rfl:     Past Surgical History:  Past Surgical History:   Procedure Laterality Date   • CARPAL TUNNEL RELEASE     •  SECTION      3   • TUBAL ABDOMINAL  "LIGATION         Depression Screen:   PHQ-2/PHQ-9 Depression Screening 1/25/2023   Retired PHQ-9 Total Score -   Retired Total Score -   Little Interest or Pleasure in Doing Things 0-->not at all   Feeling Down, Depressed or Hopeless 0-->not at all   PHQ-9: Brief Depression Severity Measure Score 0       Fall Risk Screen:  YING Fall Risk Assessment has not been completed.        Physical Exam:  Vital Signs:  Visit Vitals  /80 (BP Location: Right arm, Patient Position: Sitting, Cuff Size: Large Adult)   Pulse 96   Temp 97.7 °F (36.5 °C) (Temporal)   Resp 18   Ht 157.5 cm (62\")   Wt (!) 157 kg (345 lb 3.2 oz)   LMP 02/08/2023   SpO2 98% Comment: room air   BMI 63.14 kg/m²       Body mass index is 63.14 kg/m².      Result Review :                  Assessment and Plan:  Problem List Items Addressed This Visit        Cardiac and Vasculature    Hypertension, benign    Current Assessment & Plan     Hypertension is improving with treatment.  Continue current treatment regimen.  Dietary sodium restriction.  Weight loss.  Blood pressure will be reassessed in 3 months.         Relevant Medications    losartan (COZAAR) 25 MG tablet    Other Relevant Orders    CBC & Differential    Comprehensive Metabolic Panel    TSH    Mixed hyperlipidemia    Relevant Orders    Lipid Panel With / Chol / HDL Ratio       Health Encounters    Encounter for wellness examination - Primary    Relevant Orders    POCT urinalysis dipstick, manual (Completed)   Other Visit Diagnoses     Need for hepatitis C screening test        Relevant Orders    Hepatitis C Antibody          Class 3 Severe Obesity (BMI >=40). Obesity-related health conditions include the following: hypertension. Obesity is improving with lifestyle modifications. BMI is is above average; BMI management plan is completed. We discussed low calorie, low carb based diet program, portion control and increasing exercise.       An After Visit Summary and PPPS were given to the " patient.       Discussed injury prevention, diet and exercise, safe sexual practices, and screening for common diseases. Encouraged use of sunscreen and seatbelts. Discussed timing of  cervical cancer screening. Encouraged monthly self-breast exams, yearly clinical breast exams, and discussed timing of mammograms. Avoidance of tobacco encouraged. Limitation or avoidance of alcohol encouraged. Recommend yearly dental and eye exams. Also discussed monitoring of blood pressure, lipids.     I wore protective equipment throughout this patient encounter to include mask. Hand hygiene was performed before donning protective equipment and after removal when leaving the room.    +++++E/M portion medically necessary secondary to new or uncontrolled chronic problem+++++++    Subjective   Yoselin Gerard is here for:    Chief Complaint   Patient presents with   • Annual Exam   • Hypertension   • Hyperlipidemia       Hypertension  The current episode started more than 1 year ago. The problem is controlled. Pertinent negatives include no chest pain, malaise/fatigue or shortness of breath. Risk factors for coronary artery disease include dyslipidemia, obesity and family history. Current antihypertension treatment includes angiotensin blockers. The current treatment provides moderate improvement.   Hyperlipidemia  This is a chronic problem. The current episode started more than 1 year ago. Exacerbating diseases include obesity. She has no history of diabetes. Pertinent negatives include no chest pain or shortness of breath. She is currently on no antihyperlipidemic treatment. The current treatment provides mild improvement of lipids. Risk factors for coronary artery disease include dyslipidemia, hypertension, obesity and family history.         Physical Exam:  Review of Systems   Constitutional: Negative for malaise/fatigue.   Respiratory: Negative for shortness of breath.    Cardiovascular: Negative for chest pain.        Physical  Exam  Vitals and nursing note reviewed.   Constitutional:       General: She is not in acute distress.     Appearance: She is well-developed. She is not diaphoretic.   HENT:      Head: Normocephalic and atraumatic.      Right Ear: Tympanic membrane and external ear normal.      Left Ear: Tympanic membrane and external ear normal.      Nose: Nose normal.      Mouth/Throat:      Pharynx: No oropharyngeal exudate.   Eyes:      General: No scleral icterus.        Right eye: No discharge.         Left eye: No discharge.      Conjunctiva/sclera: Conjunctivae normal.      Pupils: Pupils are equal, round, and reactive to light.   Neck:      Thyroid: No thyromegaly.      Trachea: No tracheal deviation.   Cardiovascular:      Rate and Rhythm: Normal rate and regular rhythm.      Heart sounds: Normal heart sounds. No murmur heard.    No friction rub. No gallop.   Pulmonary:      Effort: Pulmonary effort is normal. No respiratory distress.      Breath sounds: Normal breath sounds. No stridor. No wheezing or rales.   Abdominal:      General: Bowel sounds are normal. There is no distension.      Palpations: Abdomen is soft. There is no mass.      Tenderness: There is no abdominal tenderness. There is no guarding or rebound.   Musculoskeletal:         General: No tenderness or deformity. Normal range of motion.      Cervical back: Normal range of motion and neck supple.   Lymphadenopathy:      Cervical: No cervical adenopathy.   Skin:     General: Skin is warm and dry.      Capillary Refill: Capillary refill takes less than 2 seconds.      Coloration: Skin is not pale.      Findings: No erythema or rash.   Neurological:      Mental Status: She is alert and oriented to person, place, and time.      Cranial Nerves: No cranial nerve deficit.      Sensory: No sensory deficit.      Motor: No tremor, atrophy or abnormal muscle tone.      Coordination: Coordination normal.      Gait: Gait normal.      Deep Tendon Reflexes: Reflexes are  normal and symmetric. Reflexes normal.   Psychiatric:         Behavior: Behavior normal.         Thought Content: Thought content normal.         Cognition and Memory: Memory is not impaired. She does not exhibit impaired recent memory or impaired remote memory.         Judgment: Judgment normal.         Assessment and Plan:  Problem List Items Addressed This Visit        Cardiac and Vasculature    Hypertension, benign    Current Assessment & Plan     Hypertension is improving with treatment.  Continue current treatment regimen.  Dietary sodium restriction.  Weight loss.  Blood pressure will be reassessed in 3 months.         Relevant Medications    losartan (COZAAR) 25 MG tablet    Other Relevant Orders    CBC & Differential    Comprehensive Metabolic Panel    TSH    Mixed hyperlipidemia    Relevant Orders    Lipid Panel With / Chol / HDL Ratio       Health Encounters    Encounter for wellness examination - Primary    Relevant Orders    POCT urinalysis dipstick, manual (Completed)   Other Visit Diagnoses     Need for hepatitis C screening test        Relevant Orders    Hepatitis C Antibody

## 2023-02-27 ENCOUNTER — OFFICE VISIT (OUTPATIENT)
Dept: FAMILY MEDICINE CLINIC | Facility: CLINIC | Age: 38
End: 2023-02-27
Payer: COMMERCIAL

## 2023-02-27 VITALS
SYSTOLIC BLOOD PRESSURE: 128 MMHG | HEART RATE: 96 BPM | DIASTOLIC BLOOD PRESSURE: 80 MMHG | BODY MASS INDEX: 53.92 KG/M2 | WEIGHT: 293 LBS | TEMPERATURE: 97.7 F | OXYGEN SATURATION: 98 % | RESPIRATION RATE: 18 BRPM | HEIGHT: 62 IN

## 2023-02-27 DIAGNOSIS — E78.2 MIXED HYPERLIPIDEMIA: ICD-10-CM

## 2023-02-27 DIAGNOSIS — Z00.00 ENCOUNTER FOR WELLNESS EXAMINATION: Primary | ICD-10-CM

## 2023-02-27 DIAGNOSIS — I10 HYPERTENSION, BENIGN: ICD-10-CM

## 2023-02-27 DIAGNOSIS — Z11.59 NEED FOR HEPATITIS C SCREENING TEST: ICD-10-CM

## 2023-02-27 LAB
BILIRUB BLD-MCNC: NEGATIVE MG/DL
CLARITY, POC: CLEAR
COLOR UR: YELLOW
GLUCOSE UR STRIP-MCNC: NEGATIVE MG/DL
KETONES UR QL: NEGATIVE
LEUKOCYTE EST, POC: NEGATIVE
NITRITE UR-MCNC: NEGATIVE MG/ML
PH UR: 5 [PH] (ref 5–8)
PROT UR STRIP-MCNC: NEGATIVE MG/DL
RBC # UR STRIP: NEGATIVE /UL
SP GR UR: 1.01 (ref 1–1.03)
UROBILINOGEN UR QL: NORMAL

## 2023-02-27 PROCEDURE — 99395 PREV VISIT EST AGE 18-39: CPT | Performed by: FAMILY MEDICINE

## 2023-02-27 PROCEDURE — 81002 URINALYSIS NONAUTO W/O SCOPE: CPT | Performed by: FAMILY MEDICINE

## 2023-02-27 RX ORDER — LOSARTAN POTASSIUM 25 MG/1
12.5 TABLET ORAL DAILY
Qty: 30 TABLET | Refills: 6 | Status: SHIPPED | OUTPATIENT
Start: 2023-02-27

## 2023-02-27 NOTE — ASSESSMENT & PLAN NOTE
Patient's (Body mass index is 63.14 kg/m².) indicates that they are obese (BMI >30) with health conditions that include hypertension and dyslipidemias . Weight is improving with lifestyle modifications. BMI  is above average; BMI management plan is completed. We discussed portion control and increasing exercise.

## 2023-03-15 LAB
ALBUMIN SERPL-MCNC: 4.1 G/DL (ref 3.8–4.8)
ALBUMIN/GLOB SERPL: 1.5 {RATIO} (ref 1.2–2.2)
ALP SERPL-CCNC: 75 IU/L (ref 44–121)
ALT SERPL-CCNC: 21 IU/L (ref 0–32)
AST SERPL-CCNC: 17 IU/L (ref 0–40)
BASOPHILS # BLD AUTO: 0.1 X10E3/UL (ref 0–0.2)
BASOPHILS NFR BLD AUTO: 1 %
BILIRUB SERPL-MCNC: 0.3 MG/DL (ref 0–1.2)
BUN SERPL-MCNC: 10 MG/DL (ref 6–20)
BUN/CREAT SERPL: 13 (ref 9–23)
CALCIUM SERPL-MCNC: 9.4 MG/DL (ref 8.7–10.2)
CHLORIDE SERPL-SCNC: 104 MMOL/L (ref 96–106)
CHOLEST SERPL-MCNC: 209 MG/DL (ref 100–199)
CHOLEST/HDLC SERPL: 3.5 RATIO (ref 0–4.4)
CO2 SERPL-SCNC: 25 MMOL/L (ref 20–29)
CREAT SERPL-MCNC: 0.79 MG/DL (ref 0.57–1)
EGFRCR SERPLBLD CKD-EPI 2021: 99 ML/MIN/1.73
EOSINOPHIL # BLD AUTO: 0.2 X10E3/UL (ref 0–0.4)
EOSINOPHIL NFR BLD AUTO: 3 %
ERYTHROCYTE [DISTWIDTH] IN BLOOD BY AUTOMATED COUNT: 14.4 % (ref 11.7–15.4)
GLOBULIN SER CALC-MCNC: 2.7 G/DL (ref 1.5–4.5)
GLUCOSE SERPL-MCNC: 112 MG/DL (ref 70–99)
HCT VFR BLD AUTO: 42.7 % (ref 34–46.6)
HCV IGG SERPL QL IA: NON REACTIVE
HDLC SERPL-MCNC: 59 MG/DL
HGB BLD-MCNC: 13.3 G/DL (ref 11.1–15.9)
IMM GRANULOCYTES # BLD AUTO: 0 X10E3/UL (ref 0–0.1)
IMM GRANULOCYTES NFR BLD AUTO: 0 %
LDLC SERPL CALC-MCNC: 134 MG/DL (ref 0–99)
LYMPHOCYTES # BLD AUTO: 2.2 X10E3/UL (ref 0.7–3.1)
LYMPHOCYTES NFR BLD AUTO: 30 %
MCH RBC QN AUTO: 25.1 PG (ref 26.6–33)
MCHC RBC AUTO-ENTMCNC: 31.1 G/DL (ref 31.5–35.7)
MCV RBC AUTO: 81 FL (ref 79–97)
MONOCYTES # BLD AUTO: 0.5 X10E3/UL (ref 0.1–0.9)
MONOCYTES NFR BLD AUTO: 7 %
NEUTROPHILS # BLD AUTO: 4.3 X10E3/UL (ref 1.4–7)
NEUTROPHILS NFR BLD AUTO: 59 %
PLATELET # BLD AUTO: 358 X10E3/UL (ref 150–450)
POTASSIUM SERPL-SCNC: 4.3 MMOL/L (ref 3.5–5.2)
PROT SERPL-MCNC: 6.8 G/DL (ref 6–8.5)
RBC # BLD AUTO: 5.3 X10E6/UL (ref 3.77–5.28)
SODIUM SERPL-SCNC: 142 MMOL/L (ref 134–144)
TRIGL SERPL-MCNC: 91 MG/DL (ref 0–149)
TSH SERPL DL<=0.005 MIU/L-ACNC: 2.21 UIU/ML (ref 0.45–4.5)
VLDLC SERPL CALC-MCNC: 16 MG/DL (ref 5–40)
WBC # BLD AUTO: 7.3 X10E3/UL (ref 3.4–10.8)

## 2023-06-08 ENCOUNTER — OFFICE VISIT (OUTPATIENT)
Dept: FAMILY MEDICINE CLINIC | Facility: CLINIC | Age: 38
End: 2023-06-08
Payer: COMMERCIAL

## 2023-06-08 VITALS
WEIGHT: 293 LBS | OXYGEN SATURATION: 98 % | HEIGHT: 62 IN | SYSTOLIC BLOOD PRESSURE: 126 MMHG | RESPIRATION RATE: 18 BRPM | BODY MASS INDEX: 53.92 KG/M2 | TEMPERATURE: 96.6 F | DIASTOLIC BLOOD PRESSURE: 82 MMHG | HEART RATE: 77 BPM

## 2023-06-08 DIAGNOSIS — R73.9 HYPERGLYCEMIA: ICD-10-CM

## 2023-06-08 DIAGNOSIS — I10 HYPERTENSION, BENIGN: ICD-10-CM

## 2023-06-08 DIAGNOSIS — E66.01 CLASS 3 SEVERE OBESITY DUE TO EXCESS CALORIES WITH SERIOUS COMORBIDITY AND BODY MASS INDEX (BMI) OF 60.0 TO 69.9 IN ADULT: ICD-10-CM

## 2023-06-08 DIAGNOSIS — N92.6 MENSES, IRREGULAR: Primary | ICD-10-CM

## 2023-06-08 DIAGNOSIS — E78.2 MIXED HYPERLIPIDEMIA: ICD-10-CM

## 2023-06-08 PROBLEM — M54.6 ACUTE RIGHT-SIDED THORACIC BACK PAIN: Status: RESOLVED | Noted: 2021-08-09 | Resolved: 2023-06-08

## 2023-06-08 LAB
B-HCG UR QL: NEGATIVE
EXPIRATION DATE: NORMAL
INTERNAL NEGATIVE CONTROL: NEGATIVE
INTERNAL POSITIVE CONTROL: POSITIVE
Lab: NORMAL

## 2023-06-08 PROCEDURE — 81025 URINE PREGNANCY TEST: CPT | Performed by: FAMILY MEDICINE

## 2023-06-08 PROCEDURE — 99213 OFFICE O/P EST LOW 20 MIN: CPT | Performed by: FAMILY MEDICINE

## 2023-06-08 NOTE — PROGRESS NOTES
Subjective   Yoselin Gerard is a 38 y.o. female. Presents to [facility]    Chief Complaint   Patient presents with    Hypertension    Menstrual Problem       History of Present Illness  Menstrual Irregulatity:  Yoselin Gearrd is here today to discuss irregular menses and abnormally short menses. Yoselin Gerard is -8, Parity- 3 Patient's last menstrual period was 2023 (exact date). She is advanced maternal age (35 or older). She is sexually active. Onset was months since October. Associated symptoms include breast tenderness. Pertinent surgical history includes none.     Hypertension  This is a chronic problem. The current episode started more than 1 month ago. The problem has been waxing and waning since onset. Pertinent negatives include no blurred vision, chest pain, headaches, malaise/fatigue, neck pain, palpitations, shortness of breath or sweats. There are no associated agents to hypertension. There are no known risk factors for coronary artery disease. Past treatments include nothing. Current antihypertension treatment includes angiotensin blockers. The current treatment provides moderate improvement. There are no compliance problems.    Menstrual Problem  This is a new problem. The current episode started more than 1 month ago (october). The problem has been gradually worsening. Pertinent negatives include no chest pain, congestion, coughing, fatigue, headaches, nausea, neck pain, numbness, vomiting or weakness. Nothing aggravates the symptoms. She has tried nothing for the symptoms.    Her period has become lighter. She used to heavy periods.       I personally reviewed and updated the patient's allergies, medications, problem list, and past medical, surgical, social, and family history. I have reviewed and confirmed the accuracy of the History of Present Illness and Review of Symptoms as documented by the MA/LPN/RN. Josefa Skelton MD    Allergies:  Allergies   Allergen  Reactions    Penicillins Rash and Swelling    Hydrocodone Other (See Comments)     Numbness       Social History:  Social History     Socioeconomic History    Marital status:    Tobacco Use    Smoking status: Never    Smokeless tobacco: Never   Substance and Sexual Activity    Alcohol use: Not Currently    Drug use: Defer    Sexual activity: Defer       Family History:  Family History   Problem Relation Age of Onset    Heart disease Mother     Heart disease Maternal Grandfather        Past Medical History :  Patient Active Problem List   Diagnosis    Anemia    Anxiety    Moderate episode of recurrent major depressive disorder    Gastroesophageal reflux disease, esophagitis presence not specified    Heartburn    Hypertension, benign    Moderate persistent asthma without complication    Plantar fasciitis    Palpitations    Osteoarthritis    Lumbosacral radiculopathy    Lumbar disc herniation with radiculopathy    Lower extremity edema    Chronic right-sided low back pain with right-sided sciatica    Excessive sweating    Encounter for wellness examination    Hot flashes    Migraine with aura and without status migrainosus, not intractable    Class 3 severe obesity due to excess calories with serious comorbidity and body mass index (BMI) of 60.0 to 69.9 in adult    Skin lesion of neck    Mixed hyperlipidemia    Hyperglycemia       Medication List:    Current Outpatient Medications:     losartan (COZAAR) 25 MG tablet, Take 0.5 tablets by mouth Daily., Disp: 30 tablet, Rfl: 6    thiamine (VITAMIN B-1) 100 MG tablet  tablet, Take 1 tablet by mouth Daily., Disp: , Rfl:     topiramate (TOPAMAX) 25 MG tablet, Take 1 tablet by mouth 2 (Two) Times a Day., Disp: 60 tablet, Rfl: 12    vitamin B-12 (CYANOCOBALAMIN) 1000 MCG tablet, Take 1 tablet by mouth Daily., Disp: , Rfl:     Past Surgical History:  Past Surgical History:   Procedure Laterality Date    CARPAL TUNNEL RELEASE       SECTION      3    TUBAL  "ABDOMINAL LIGATION           Physical Exam:      Vital Signs:    Vitals:    06/08/23 1604   BP: 126/82   Pulse: 77   Resp: 18   Temp: 96.6 °F (35.9 °C)   SpO2: 98%        /82   Pulse 77   Temp 96.6 °F (35.9 °C)   Resp 18   Ht 157.5 cm (62\")   Wt (!) 157 kg (345 lb 14.4 oz)   LMP 05/29/2023 (Exact Date)   SpO2 98%   BMI 63.27 kg/m²     Wt Readings from Last 3 Encounters:   06/08/23 (!) 157 kg (345 lb 14.4 oz)   02/27/23 (!) 157 kg (345 lb 3.2 oz)   01/25/23 (!) 158 kg (348 lb 3.2 oz)       Result Review :                Physical Exam  Vitals reviewed.   Constitutional:       Appearance: Normal appearance. She is well-developed.   HENT:      Head: Normocephalic and atraumatic.   Eyes:      General:         Right eye: No discharge.         Left eye: No discharge.   Cardiovascular:      Rate and Rhythm: Normal rate and regular rhythm.      Heart sounds: Normal heart sounds. No murmur heard.    No friction rub. No gallop.   Pulmonary:      Effort: Pulmonary effort is normal. No respiratory distress.      Breath sounds: Normal breath sounds. No wheezing or rales.   Skin:     General: Skin is warm and dry.      Findings: No rash.   Neurological:      Mental Status: She is alert and oriented to person, place, and time.      Coordination: Coordination normal.      Gait: Gait normal.   Psychiatric:         Behavior: Behavior is cooperative.     Orders Signed This Visit (8)    Comprehensive Metabolic Panel (Canceled)         Lab Collect, Specimen Types - Blood;, Resulting Agency - [facility] (AMBULATORY), New collection       Comprehensive Metabolic Panel (Completed)         Lab Collect, Specimen Types - Blood;, Resulting Agency - [facility] (AMBULATORY) Collection Date-6/12/2023 Collection Time- 2:56 PM, New collection       Hemoglobin A1c (Canceled)         Lab Collect, Specimen Types - Blood;, Resulting Agency - [facility] (AMBUL[facility]ORY), New collection       Hemoglobin A1c (Completed)    "      Lab Collect, Specimen Types - Blood;, Resulting [facility] (AMBULATORY) Collection Date-6/12/2023 Collection Time- 2:56 PM, New collection       Lipid Panel With / Chol / HDL Ratio (Canceled)         Lab Collect, Specimen Types - Blood;, Resulting Agency - [facility] (AMBULATORY), New collection       Lipid Panel With / Chol / HDL Ratio (Completed)         Lab Collect, Specimen Types - Blood;, Resulting [facility] Collection Date-6/12/2023 Collection Time- 2:56 PM, New collection       POC Pregnancy, Urine (Completed)         Routine       TSH (Completed)         Lab Collect, Specimen Types - Blood;, Resulting [facility] (AMBULATORY) Collection Date-6/12/2023 Collection Time- 2:56 PM, New collection     Assessment and Plan:  Problems Addressed this Visit          Cardiac and Vasculature    Hypertension, benign     Hypertension is unchanged.  Continue current treatment regimen.  Dietary sodium restriction.  Weight loss.  Blood pressure will be reassessed in 3 months.         Mixed hyperlipidemia    Relevant Orders    Comprehensive Metabolic Panel (Completed)    Lipid Panel With / Chol / HDL Ratio (Completed)       Endocrine and Metabolic    Class 3 severe obesity due to excess calories with serious comorbidity and body mass index (BMI) of 60.0 to 69.9 in adult    Hyperglycemia    Relevant Orders    Hemoglobin A1c (Completed)     Other Visit Diagnoses       Menses, irregular    -  Primary    will check labs    Relevant Orders    POC Pregnancy, Urine (Completed)    TSH (Completed)          Diagnoses         Codes Comments    Menses, irregular    -  Primary ICD-10-CM: N92.6  ICD-9-CM: 626.4 will check labs    Class 3 severe obesity due to excess calories with serious comorbidity and body mass index (BMI) of 60.0 to 69.9 in adult     ICD-10-CM: E66.01, Z68.44  ICD-9-CM: 278.01, V85.44     Hypertension, benign     ICD-10-CM: I10  ICD-9-CM: 401.1      Mixed hyperlipidemia     ICD-10-CM: E78.2  ICD-9-CM: 272.2     Hyperglycemia     ICD-10-CM: R73.9  ICD-9-CM: 790.29                          An After Visit Summary and PPPS were given to the patient.

## 2023-06-13 LAB
ALBUMIN SERPL-MCNC: 4.1 G/DL (ref 3.8–4.8)
ALBUMIN/GLOB SERPL: 1.4 {RATIO} (ref 1.2–2.2)
ALP SERPL-CCNC: 70 IU/L (ref 44–121)
ALT SERPL-CCNC: 22 IU/L (ref 0–32)
AST SERPL-CCNC: 21 IU/L (ref 0–40)
BILIRUB SERPL-MCNC: 0.4 MG/DL (ref 0–1.2)
BUN SERPL-MCNC: 8 MG/DL (ref 6–20)
BUN/CREAT SERPL: 12 (ref 9–23)
CALCIUM SERPL-MCNC: 9.1 MG/DL (ref 8.7–10.2)
CHLORIDE SERPL-SCNC: 102 MMOL/L (ref 96–106)
CHOLEST SERPL-MCNC: 215 MG/DL (ref 100–199)
CHOLEST/HDLC SERPL: 3.4 RATIO (ref 0–4.4)
CO2 SERPL-SCNC: 22 MMOL/L (ref 20–29)
CREAT SERPL-MCNC: 0.69 MG/DL (ref 0.57–1)
EGFRCR SERPLBLD CKD-EPI 2021: 115 ML/MIN/1.73
GLOBULIN SER CALC-MCNC: 2.9 G/DL (ref 1.5–4.5)
GLUCOSE SERPL-MCNC: 113 MG/DL (ref 70–99)
HBA1C MFR BLD: 6.2 % (ref 4.8–5.6)
HDLC SERPL-MCNC: 63 MG/DL
LDLC SERPL CALC-MCNC: 136 MG/DL (ref 0–99)
POTASSIUM SERPL-SCNC: 4.7 MMOL/L (ref 3.5–5.2)
PROT SERPL-MCNC: 7 G/DL (ref 6–8.5)
SODIUM SERPL-SCNC: 140 MMOL/L (ref 134–144)
TRIGL SERPL-MCNC: 89 MG/DL (ref 0–149)
TSH SERPL DL<=0.005 MIU/L-ACNC: 1.24 UIU/ML (ref 0.45–4.5)
VLDLC SERPL CALC-MCNC: 16 MG/DL (ref 5–40)

## 2023-06-28 ENCOUNTER — TELEPHONE (OUTPATIENT)
Dept: FAMILY MEDICINE CLINIC | Facility: CLINIC | Age: 38
End: 2023-06-28

## 2023-06-28 NOTE — TELEPHONE ENCOUNTER
Mrs trevizo called in today asking if we could get her a sooner jhonny then the one she had scheduled currently, she missed her original jhonny and I didn't see anything sooner than the 18th of July. She is needing FMLA to be filled out and returned by the 13th of July.

## 2023-06-29 NOTE — TELEPHONE ENCOUNTER
Called pt and let her know that Dr. Skelton does not have any openings but if we have a cancellation we will call her

## 2023-06-30 PROBLEM — N92.6 IRREGULAR MENSES: Status: ACTIVE | Noted: 2023-06-30

## 2023-06-30 PROBLEM — R73.03 BORDERLINE DIABETES: Status: ACTIVE | Noted: 2023-06-30

## 2023-06-30 PROBLEM — R87.611 PAP SMEAR OF CERVIX WITH ASCUS, CANNOT EXCLUDE HGSIL: Status: ACTIVE | Noted: 2023-06-30

## 2023-06-30 PROBLEM — Z67.91 RH D NEGATIVE BLOOD TYPE: Status: ACTIVE | Noted: 2023-06-30

## 2023-06-30 PROBLEM — A56.09 CHLAMYDIA VAGINITIS/CERVICITIS: Status: ACTIVE | Noted: 2023-06-30

## 2023-06-30 PROBLEM — A56.02 CHLAMYDIA VAGINITIS/CERVICITIS: Status: ACTIVE | Noted: 2023-06-30
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
## 2023-07-18 PROBLEM — S90.561A INSECT BITE OF RIGHT ANKLE: Status: ACTIVE | Noted: 2023-07-18

## 2023-07-25 DIAGNOSIS — G43.109 MIGRAINE WITH AURA AND WITHOUT STATUS MIGRAINOSUS, NOT INTRACTABLE: ICD-10-CM

## 2023-07-25 RX ORDER — TOPIRAMATE 25 MG/1
TABLET ORAL
Qty: 180 TABLET | Refills: 4 | Status: SHIPPED | OUTPATIENT
Start: 2023-07-25

## 2023-09-17 DIAGNOSIS — R73.03 BORDERLINE DIABETES: ICD-10-CM

## 2023-10-13 NOTE — PROGRESS NOTES
Subjective   Yoselin Gerard is a 38 y.o. female. Presents to [facility]    Chief Complaint   Patient presents with    Anxiety       Anxiety  Presents for follow-up visit. Symptoms include irritability and nervous/anxious behavior. Patient reports no decreased concentration, excessive worry, insomnia, palpitations, panic or restlessness. Symptoms occur occasionally. The severity of symptoms is moderate. The quality of sleep is fair. Nighttime awakenings: several, one to two.            I personally reviewed and updated the patient's allergies, medications, problem list, and past medical, surgical, social, and family history. I have reviewed and confirmed the accuracy of the History of Present Illness and Review of Symptoms as documented by the MA/LPN/RN. Josefa Skelton MD    Allergies:  Allergies   Allergen Reactions    Penicillins Rash and Swelling    Hydrocodone Other (See Comments)     Numbness       Social History:  Social History     Socioeconomic History    Marital status:    Tobacco Use    Smoking status: Never    Smokeless tobacco: Never   Substance and Sexual Activity    Alcohol use: Not Currently    Drug use: Defer    Sexual activity: Defer       Family History:  Family History   Problem Relation Age of Onset    Heart disease Mother     Heart disease Maternal Grandfather        Past Medical History :  Patient Active Problem List   Diagnosis    Anemia    Anxiety    Moderate episode of recurrent major depressive disorder    Gastroesophageal reflux disease, esophagitis presence not specified    Heartburn    Hypertension, benign    Moderate persistent asthma without complication    Plantar fasciitis    Palpitations    Osteoarthritis    Lumbosacral radiculopathy    Lumbar disc herniation with radiculopathy    Lower extremity edema    Chronic right-sided low back pain with right-sided sciatica    Excessive sweating    Encounter for wellness examination    Hot flashes    Migraine with  "aura and without status migrainosus, not intractable    Class 3 severe obesity due to excess calories with serious comorbidity and body mass index (BMI) of 60.0 to 69.9 in adult    Skin lesion of neck    Mixed hyperlipidemia    Hyperglycemia    Pap smear of cervix with ASCUS, cannot exclude HGSIL    Rh D negative blood type    Chlamydia vaginitis/cervicitis    Irregular menses    Borderline diabetes    Insect bite of right ankle       Medication List:    Current Outpatient Medications:     losartan (COZAAR) 25 MG tablet, Take 0.5 tablets by mouth Daily., Disp: 30 tablet, Rfl: 6    metFORMIN (GLUCOPHAGE) 500 MG tablet, TAKE 1 TABLET BY MOUTH EVERY DAY WITH BREAKFAST, Disp: 30 tablet, Rfl: 1    rosuvastatin (CRESTOR) 5 MG tablet, Take 1 tablet by mouth Daily., Disp: 30 tablet, Rfl: 12    sertraline (ZOLOFT) 50 MG tablet, Take 1 tablet by mouth Daily., Disp: 30 tablet, Rfl: 12    thiamine (VITAMIN B-1) 100 MG tablet  tablet, Take 1 tablet by mouth Daily., Disp: , Rfl:     topiramate (TOPAMAX) 25 MG tablet, TAKE 1 TABLET BY MOUTH TWICE A DAY, Disp: 180 tablet, Rfl: 4    triamcinolone (KENALOG) 0.1 % ointment, Apply 1 application  topically to the appropriate area as directed 2 (Two) Times a Day., Disp: 30 g, Rfl: 0    vitamin B-12 (CYANOCOBALAMIN) 1000 MCG tablet, Take 1 tablet by mouth Daily., Disp: , Rfl:     Past Surgical History:  Past Surgical History:   Procedure Laterality Date    CARPAL TUNNEL RELEASE       SECTION      3    TUBAL ABDOMINAL LIGATION           Physical Exam:      Vital Signs:    Vitals:    10/17/23 0908   BP: 136/84   Pulse: 77   Resp: 18   Temp: 97.1 °F (36.2 °C)   SpO2: 98%        /84 (BP Location: Right arm, Patient Position: Sitting, Cuff Size: Adult)   Pulse 77   Temp 97.1 °F (36.2 °C) (Temporal)   Resp 18   Ht 157.5 cm (62\")   Wt (!) 156 kg (344 lb 12.8 oz)   LMP 10/11/2023   SpO2 98% Comment: room air  BMI 63.06 kg/m²     Wt Readings from Last 3 Encounters:   10/17/23 " (!) 156 kg (344 lb 12.8 oz)   07/18/23 (!) 150 kg (331 lb 9.6 oz)   06/30/23 (!) 153 kg (338 lb 6.4 oz)       Result Review :                Physical Exam  Vitals reviewed.   Constitutional:       Appearance: Normal appearance. She is well-developed.   HENT:      Head: Normocephalic and atraumatic.   Eyes:      General:         Right eye: No discharge.         Left eye: No discharge.   Cardiovascular:      Rate and Rhythm: Normal rate and regular rhythm.      Heart sounds: Normal heart sounds. No murmur heard.     No friction rub. No gallop.   Pulmonary:      Effort: Pulmonary effort is normal. No respiratory distress.      Breath sounds: Normal breath sounds. No wheezing or rales.   Skin:     General: Skin is warm and dry.      Findings: No rash.   Neurological:      Mental Status: She is alert and oriented to person, place, and time.      Coordination: Coordination normal.      Gait: Gait normal.   Psychiatric:         Behavior: Behavior is cooperative.         Assessment and Plan:  Problems Addressed this Visit          Endocrine and Metabolic    Class 3 severe obesity due to excess calories with serious comorbidity and body mass index (BMI) of 60.0 to 69.9 in adult     Patient's (Body mass index is 63.06 kg/m².) indicates that they are obese (BMI >30) with health conditions that include hypertension and dyslipidemias . Weight is worsening. BMI  is above average; BMI management plan is completed. We discussed portion control and increasing exercise.             Mental Health    Anxiety - Primary      The 50mg of zoloft is helping.   Will continue current medication    Good social support, no suicidal or homicidal ideation. Diagnosis and treatment discussed. Discussed counseling. Return as needed         Relevant Medications    sertraline (ZOLOFT) 50 MG tablet     Diagnoses         Codes Comments    Anxiety    -  Primary ICD-10-CM: F41.9  ICD-9-CM: 300.00     Class 3 severe obesity due to excess calories with  serious comorbidity and body mass index (BMI) of 60.0 to 69.9 in adult     ICD-10-CM: E66.01, Z68.44  ICD-9-CM: 278.01, V85.44                     An After Visit Summary and PPPS were given to the patient.

## 2023-10-17 ENCOUNTER — OFFICE VISIT (OUTPATIENT)
Dept: FAMILY MEDICINE CLINIC | Facility: CLINIC | Age: 38
End: 2023-10-17
Payer: COMMERCIAL

## 2023-10-17 VITALS
HEART RATE: 77 BPM | SYSTOLIC BLOOD PRESSURE: 136 MMHG | WEIGHT: 293 LBS | DIASTOLIC BLOOD PRESSURE: 84 MMHG | RESPIRATION RATE: 18 BRPM | OXYGEN SATURATION: 98 % | BODY MASS INDEX: 53.92 KG/M2 | TEMPERATURE: 97.1 F | HEIGHT: 62 IN

## 2023-10-17 DIAGNOSIS — F41.9 ANXIETY: Primary | ICD-10-CM

## 2023-10-17 DIAGNOSIS — E66.01 CLASS 3 SEVERE OBESITY DUE TO EXCESS CALORIES WITH SERIOUS COMORBIDITY AND BODY MASS INDEX (BMI) OF 60.0 TO 69.9 IN ADULT: ICD-10-CM

## 2023-10-17 PROBLEM — J01.00 ACUTE NON-RECURRENT MAXILLARY SINUSITIS: Status: RESOLVED | Noted: 2022-03-29 | Resolved: 2023-10-17

## 2023-10-17 PROCEDURE — 99213 OFFICE O/P EST LOW 20 MIN: CPT | Performed by: FAMILY MEDICINE

## 2023-10-17 NOTE — ASSESSMENT & PLAN NOTE
Patient's (Body mass index is 63.06 kg/m².) indicates that they are obese (BMI >30) with health conditions that include hypertension and dyslipidemias . Weight is worsening. BMI  is above average; BMI management plan is completed. We discussed portion control and increasing exercise.

## 2023-10-17 NOTE — ASSESSMENT & PLAN NOTE
The 50mg of zoloft is helping.   Will continue current medication    Good social support, no suicidal or homicidal ideation. Diagnosis and treatment discussed. Discussed counseling. Return as needed

## 2023-12-27 DIAGNOSIS — R73.03 BORDERLINE DIABETES: ICD-10-CM

## 2024-01-02 NOTE — PROGRESS NOTES
Subjective   Yoselin Gerard is a 38 y.o. female. Presents to [facility]    Chief Complaint   Patient presents with    Hospital Follow Up Visit    Chest Pain    Hypertension       History of Present Illness  Yoselin was seen at [facility] on 12/20/23. She was seen for chest pain. Labs that were performed during the encounter included: CMP-abnormal and CBC-normal. Diagnostic studies that were performed included: Chest x-ray-normal and CT Scan-pulmonary nodule, cardiomegaly . Medication changes: ibuprofen 600 MG and Xanax..   Chest Pain   This is a new problem. The current episode started 1 to 4 weeks ago. The onset quality is gradual. The problem occurs daily. The problem has been waxing and waning. The pain is present in the lateral region. The pain is mild. The quality of the pain is described as sharp. The pain radiates to the left shoulder. Associated symptoms include back pain, headaches, irregular heartbeat, malaise/fatigue and palpitations. Pertinent negatives include no exertional chest pressure, nausea, near-syncope or vomiting. The pain is aggravated by nothing. Treatments tried: xanax. The treatment provided mild relief. Risk factors include stress.   Hypertension  This is a chronic problem. The current episode started more than 1 year ago. The problem has been stable since onset. The problem is controlled. Associated symptoms include chest pain, headaches, malaise/fatigue and palpitations. Current antihypertension treatment includes angiotensin blockers. The current treatment provides significant improvement. Compliance problems include diet.       Her mom has had 3 MI and 3 CVA's    I personally reviewed and updated the patient's allergies, medications, problem list, and past medical, surgical, social, and family history. I have reviewed and confirmed the accuracy of the History of Present Illness and Review of Symptoms as documented by the MA/LPN/RN. Josefa Skelton,  MD    Allergies:  Allergies   Allergen Reactions    Penicillins Rash and Swelling    Hydrocodone Other (See Comments)     Numbness       Social History:  Social History     Socioeconomic History    Marital status:    Tobacco Use    Smoking status: Never    Smokeless tobacco: Never   Vaping Use    Vaping Use: Never used   Substance and Sexual Activity    Alcohol use: Not Currently    Drug use: Not Currently    Sexual activity: Defer       Family History:  Family History   Problem Relation Age of Onset    Heart attack Mother     Heart disease Mother     Heart disease Maternal Grandfather        Past Medical History :  Patient Active Problem List   Diagnosis    Anemia    Anxiety    Moderate episode of recurrent major depressive disorder    Gastroesophageal reflux disease, esophagitis presence not specified    Heartburn    Hypertension, benign    Moderate persistent asthma without complication    Plantar fasciitis    Palpitations    Osteoarthritis    Lumbosacral radiculopathy    Lumbar disc herniation with radiculopathy    Lower extremity edema    Chronic right-sided low back pain with right-sided sciatica    Excessive sweating    Encounter for wellness examination    Hot flashes    Migraine with aura and without status migrainosus, not intractable    Class 3 severe obesity due to excess calories with serious comorbidity and body mass index (BMI) of 60.0 to 69.9 in adult    Skin lesion of neck    Mixed hyperlipidemia    Pap smear of cervix with ASCUS, cannot exclude HGSIL    Rh D negative blood type    Chlamydia vaginitis/cervicitis    Irregular menses    Type 2 diabetes mellitus with hyperglycemia, without long-term current use of insulin    Atypical chest pain       Medication List:    Current Outpatient Medications:     ALPRAZolam (XANAX) 0.25 MG tablet, Take 1 tablet by mouth 3 (Three) Times a Day As Needed for Anxiety., Disp: , Rfl:     ibuprofen (ADVIL,MOTRIN) 600 MG tablet, Take 1 tablet by mouth 3 times  "a day., Disp: , Rfl:     metFORMIN (GLUCOPHAGE) 500 MG tablet, Take 1 tablet by mouth 2 (Two) Times a Day With Meals., Disp: 60 tablet, Rfl: 12    rosuvastatin (CRESTOR) 5 MG tablet, Take 1 tablet by mouth Daily., Disp: 30 tablet, Rfl: 12    thiamine (VITAMIN B-1) 100 MG tablet  tablet, Take 1 tablet by mouth Daily., Disp: , Rfl:     topiramate (TOPAMAX) 25 MG tablet, TAKE 1 TABLET BY MOUTH TWICE A DAY, Disp: 180 tablet, Rfl: 4    triamcinolone (KENALOG) 0.1 % ointment, Apply 1 application  topically to the appropriate area as directed 2 (Two) Times a Day., Disp: 30 g, Rfl: 0    vitamin B-12 (CYANOCOBALAMIN) 1000 MCG tablet, Take 1 tablet by mouth Daily., Disp: , Rfl:     Blood Glucose Monitoring Suppl (True Met[employer] Go Glucose Meter) w/Device kit, Use 1 each Daily., Disp: 1 kit, Rfl: 0    citalopram (CeleXA) 40 MG tablet, Take 1 tablet by mouth Daily., Disp: 30 tablet, Rfl: 12    glucose blood (True Metrix Blood Glucose Test) test strip, 1 each by Other route Daily., Disp: 100 each, Rfl: 3    Lancets Ultra Fine misc, Use 1 each Daily., Disp: 100 each, Rfl: 3    losartan (COZAAR) 25 MG tablet, Take 1 tablet by mouth Daily., Disp: 30 tablet, Rfl: 6    Past Surgical History:  Past Surgical History:   Procedure Laterality Date    CARPAL TUNNEL RELEASE       SECTION      3    TUBAL ABDOMINAL LIGATION           Physical Exam:      Vital Signs:    Vitals:    24 1141   BP: 126/80   Pulse: 77   Resp: 18   Temp: 97.7 °F (36.5 °C)   SpO2: 98%        /80 (BP Location: Right arm, Patient Position: Sitting, Cuff Size: Large Adult)   Pulse 77   Temp 97.7 °F (36.5 °C) (Temporal)   Resp 18   Ht 157.5 cm (62\")   Wt (!) 161 kg (354 lb)   LMP 2023   SpO2 98% Comment: room air  BMI 64.75 kg/m²     Wt Readings from Last 3 Encounters:   01/10/24 (!) 161 kg (354 lb)   24 (!) 161 kg (354 lb)   10/17/23 (!) 156 kg (344 lb 12.8 oz)       Result Review :   The following data was reviewed by: Josefa Francisco " MD Costa on 01/08/2024:  A1C Last 3 Results          6/12/2023    14:56 1/8/2024    12:03   HGBA1C Last 3 Results   Hemoglobin A1C 6.2  6.8      Data reviewed : Radiologic studies CT and CXR 12/20/23, Recent hospitalization notes [facility] ER 12/20/23, and cbc, cmp troponin, d dimer          Physical Exam  Vitals reviewed.   Constitutional:       Appearance: Normal appearance. She is well-developed.   HENT:      Head: Normocephalic and atraumatic.   Eyes:      General:         Right eye: No discharge.         Left eye: No discharge.   Cardiovascular:      Rate and Rhythm: Normal rate and regular rhythm.      Heart sounds: Normal heart sounds. No murmur heard.     No friction rub. No gallop.   Pulmonary:      Effort: Pulmonary effort is normal. No respiratory distress.      Breath sounds: Normal breath sounds. No wheezing or rales.   Skin:     General: Skin is warm and dry.      Findings: No rash.   Neurological:      Mental Status: She is alert and oriented to person, place, and time.      Coordination: Coordination normal.      Gait: Gait normal.   Psychiatric:         Mood and Affect: Mood normal.         Behavior: Behavior is cooperative.         Assessment and Plan:  Problems Addressed this Visit          Cardiac and Vasculature    Hypertension, benign     Hypertension is improving with treatment.  Continue current treatment regimen.  Dietary sodium restriction.  Weight loss.  Blood pressure will be reassessed in 3 months.  She was on losartan.          Mixed hyperlipidemia     To have labs next visit            Endocrine and Metabolic    Class 3 severe obesity due to excess calories with serious comorbidity and body mass index (BMI) of 60.0 to 69.9 in adult    Type 2 diabetes mellitus with hyperglycemia, without long-term current use of insulin     new  Diabetes is worsening.   Reminded to bring in blood sugar diary at next visit.  Dietary recommendations for ADA diet.  Medication changes per orders.  Diabetes will  be reassessed in 1 month.    Diagnosis, treatment and and course discussed. Potential side effects discussed. Return if there is worsening or persistence of symptoms.     Start metformin               Relevant Medications    metFORMIN (GLUCOPHAGE) 500 MG tablet       Mental Health    Anxiety     Worse  Adding to her medical issues. Change to celexa    Good social support, no suicidal or homicidal ideation. Diagnosis and treatment discussed. Discussed counseling. Discussed medication, dosing and adverse effects. Return in 4 weeks    Diagnosis, treatment and and course discussed. Potential side effects discussed. Return if there is worsening or persistence of symptoms.            Relevant Medications    citalopram (CeleXA) 40 MG tablet     Other Visit Diagnoses       Hospital discharge follow-up    -  Primary    Chest pain, unspecified type        Relevant Orders    Ambulatory Referral to Cardiology    CT Angiogram Chest    Abnormal CT scan, chest        Relevant Orders    CT Angiogram Chest          Diagnoses         Codes Comments    Hospital discharge follow-up    -  Primary ICD-10-CM: Z09  ICD-9-CM: V67.59     Chest pain, unspecified type     ICD-10-CM: R07.9  ICD-9-CM: 786.50     Class 3 severe obesity due to excess calories with serious comorbidity and body mass index (BMI) of 60.0 to 69.9 in adult     ICD-10-CM: E66.01, Z68.44  ICD-9-CM: 278.01, V85.44     Borderline diabetes     ICD-10-CM: R73.03  ICD-9-CM: 790.29     Abnormal CT scan, chest     ICD-10-CM: R93.89  ICD-9-CM: 793.2     Hypertension, benign     ICD-10-CM: I10  ICD-9-CM: 401.1     Mixed hyperlipidemia     ICD-10-CM: E78.2  ICD-9-CM: 272.2     Anxiety     ICD-10-CM: F41.9  ICD-9-CM: 300.00                     An After Visit Summary and PPPS were given to the patient.

## 2024-01-08 ENCOUNTER — OFFICE VISIT (OUTPATIENT)
Dept: FAMILY MEDICINE CLINIC | Facility: CLINIC | Age: 39
End: 2024-01-08
Payer: COMMERCIAL

## 2024-01-08 VITALS
OXYGEN SATURATION: 98 % | TEMPERATURE: 97.7 F | BODY MASS INDEX: 53.92 KG/M2 | SYSTOLIC BLOOD PRESSURE: 126 MMHG | DIASTOLIC BLOOD PRESSURE: 80 MMHG | RESPIRATION RATE: 18 BRPM | HEART RATE: 77 BPM | HEIGHT: 62 IN | WEIGHT: 293 LBS

## 2024-01-08 DIAGNOSIS — R07.9 CHEST PAIN, UNSPECIFIED TYPE: ICD-10-CM

## 2024-01-08 DIAGNOSIS — Z09 HOSPITAL DISCHARGE FOLLOW-UP: Primary | ICD-10-CM

## 2024-01-08 DIAGNOSIS — E66.01 CLASS 3 SEVERE OBESITY DUE TO EXCESS CALORIES WITH SERIOUS COMORBIDITY AND BODY MASS INDEX (BMI) OF 60.0 TO 69.9 IN ADULT: ICD-10-CM

## 2024-01-08 DIAGNOSIS — F41.9 ANXIETY: ICD-10-CM

## 2024-01-08 DIAGNOSIS — R93.89 ABNORMAL CT SCAN, CHEST: ICD-10-CM

## 2024-01-08 DIAGNOSIS — I10 HYPERTENSION, BENIGN: ICD-10-CM

## 2024-01-08 DIAGNOSIS — E78.2 MIXED HYPERLIPIDEMIA: ICD-10-CM

## 2024-01-08 DIAGNOSIS — R73.03 BORDERLINE DIABETES: ICD-10-CM

## 2024-01-08 PROBLEM — R07.89 ATYPICAL CHEST PAIN: Status: ACTIVE | Noted: 2024-01-08

## 2024-01-08 PROBLEM — E11.65 TYPE 2 DIABETES MELLITUS WITH HYPERGLYCEMIA, WITHOUT LONG-TERM CURRENT USE OF INSULIN: Status: ACTIVE | Noted: 2023-06-30

## 2024-01-08 PROBLEM — R73.9 HYPERGLYCEMIA: Status: RESOLVED | Noted: 2023-06-08 | Resolved: 2024-01-08
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
## 2024-01-08 PROBLEM — S90.561A INSECT BITE OF RIGHT ANKLE: Status: RESOLVED | Noted: 2023-07-18 | Resolved: 2024-01-08

## 2024-01-08 LAB
EXPIRATION DATE: ABNORMAL
HBA1C MFR BLD: 6.8 % (ref 4.5–5.7)
Lab: ABNORMAL

## 2024-01-08 RX ORDER — ALPRAZOLAM 0.25 MG/1
0.25 TABLET ORAL 3 TIMES DAILY PRN
COMMUNITY
Start: 2023-12-21

## 2024-01-08 RX ORDER — IBUPROFEN 600 MG/1
1 TABLET ORAL 3 TIMES DAILY
COMMUNITY
Start: 2023-12-21

## 2024-01-08 RX ORDER — CITALOPRAM 40 MG/1
40 TABLET ORAL DAILY
Qty: 30 TABLET | Refills: 12 | Status: SHIPPED | OUTPATIENT
Start: 2024-01-08

## 2024-01-08 NOTE — ASSESSMENT & PLAN NOTE
new  Diabetes is worsening.   Reminded to bring in blood sugar diary at next visit.  Dietary recommendations for ADA diet.  Medication changes per orders.  Diabetes will be reassessed in 1 month.    Diagnosis, treatment and and course discussed. Potential side effects discussed. Return if there is worsening or persistence of symptoms.     Start metformin

## 2024-01-09 ENCOUNTER — TELEPHONE (OUTPATIENT)
Dept: FAMILY MEDICINE CLINIC | Facility: CLINIC | Age: 39
End: 2024-01-09
Payer: COMMERCIAL

## 2024-01-10 ENCOUNTER — OFFICE VISIT (OUTPATIENT)
Dept: CARDIOLOGY | Facility: CLINIC | Age: 39
End: 2024-01-10
Payer: COMMERCIAL

## 2024-01-10 VITALS
BODY MASS INDEX: 53.92 KG/M2 | HEART RATE: 71 BPM | RESPIRATION RATE: 18 BRPM | OXYGEN SATURATION: 99 % | HEIGHT: 62 IN | DIASTOLIC BLOOD PRESSURE: 72 MMHG | WEIGHT: 293 LBS | SYSTOLIC BLOOD PRESSURE: 139 MMHG

## 2024-01-10 DIAGNOSIS — I10 HYPERTENSION, BENIGN: ICD-10-CM

## 2024-01-10 DIAGNOSIS — F41.9 ANXIETY: Primary | ICD-10-CM

## 2024-01-10 DIAGNOSIS — R07.89 ATYPICAL CHEST PAIN: ICD-10-CM

## 2024-01-10 DIAGNOSIS — E66.01 MORBID OBESITY WITH BMI OF 50.0-59.9, ADULT: ICD-10-CM

## 2024-01-10 PROCEDURE — 99204 OFFICE O/P NEW MOD 45 MIN: CPT | Performed by: INTERNAL MEDICINE

## 2024-01-10 RX ORDER — LOSARTAN POTASSIUM 25 MG/1
25 TABLET ORAL DAILY
Qty: 30 TABLET | Refills: 6 | Status: SHIPPED | OUTPATIENT
Start: 2024-01-10

## 2024-01-10 NOTE — PROGRESS NOTES
Date of Office Visit: 01/10/2024  Encounter Provider: Dr. Nitish Chavez  Place of Service: [facility] CARDIOLOGY [place]  Patient Name: Yoselin Gerard  :1985  Josefa Skelton MD    Chief Complaint   Patient presents with    Chest Pain    Hypertension    Hyperlipidemia    Consult     History of Present Illness:    I am pleased to see Mrs. Gerard in my office today as a new consultation.    As you know, patient is a 38-year-old white female whose past medical history is significant for hypertension, diabetes mellitus, hyperlipidemia, obesity, who is referred to me for left-sided chest pain.    In 2023, patient was admitted at [facility] with symptom of shoulder pain and chest pain.  Patient reports that she woke up from her sleep and had exquisite pain on the left side of the chest and left shoulder and the back.  It was tender to the movement as well as to the touch.  Patient take deep breaths and it hurts her.  Patient sleeps on her left side of the chest.  Patient was evaluated in the hospital and was subsequently discharged.  Her blood pressure was noted to be high in the emergency room.  Patient was considered to have anxiety attack.  Patient was given Xanax.  Patient was also given ibuprofen.    Patient does not have previous history of CAD, PCI or MI.  She does not smoke or abuse alcohol.    EKG done at PCP office shows poor progression of R wave.  No ST changes for active ischemia.    I would recommend to proceed with CT calcium scoring.  I would also proceed with echocardiogram to rule out pericarditis.  Patient is advised to continue ibuprofen for now.  Possibility of pancreatitis is there but I suspect patient has musculoskeletal pain possibly sleeping on the left side of the chest.        Past Medical History:   Diagnosis Date    Anxiety     Hyperlipidemia     Hypertension     PTSD (post-traumatic stress disorder)          Past Surgical History:   Procedure Laterality Date     CARPAL TUNNEL RELEASE       SECTION      3    TUBAL ABDOMINAL LIGATION             Current Outpatient Medications:     ALPRAZolam (XANAX) 0.25 MG tablet, Take 1 tablet by mouth 3 (Three) Times a Day As Needed for Anxiety., Disp: , Rfl:     citalopram (CeleXA) 40 MG tablet, Take 1 tablet by mouth Daily., Disp: 30 tablet, Rfl: 12    ibuprofen (ADVIL,MOTRIN) 600 MG tablet, Take 1 tablet by mouth 3 times a day., Disp: , Rfl:     losartan (COZAAR) 25 MG tablet, Take 1 tablet by mouth Daily., Disp: 30 tablet, Rfl: 6    metFORMIN (GLUCOPHAGE) 500 MG tablet, Take 1 tablet by mouth 2 (Two) Times a Day With Meals., Disp: 60 tablet, Rfl: 12    rosuvastatin (CRESTOR) 5 MG tablet, Take 1 tablet by mouth Daily., Disp: 30 tablet, Rfl: 12    thiamine (VITAMIN B-1) 100 MG tablet  tablet, Take 1 tablet by mouth Daily., Disp: , Rfl:     topiramate (TOPAMAX) 25 MG tablet, TAKE 1 TABLET BY MOUTH TWICE A DAY, Disp: 180 tablet, Rfl: 4    triamcinolone (KENALOG) 0.1 % ointment, Apply 1 application  topically to the appropriate area as directed 2 (Two) Times a Day., Disp: 30 g, Rfl: 0    vitamin B-12 (CYANOCOBALAMIN) 1000 MCG tablet, Take 1 tablet by mouth Daily., Disp: , Rfl:       Social History     Socioeconomic History    Marital status:    Tobacco Use    Smoking status: Never    Smokeless tobacco: Never   Vaping Use    Vaping Use: Never used   Substance and Sexual Activity    Alcohol use: Not Currently    Drug use: Not Currently    Sexual activity: Defer         Review of Systems   Constitutional: Negative for chills and fever.   HENT:  Negative for ear discharge and nosebleeds.    Eyes:  Negative for discharge and redness.   Cardiovascular:  Positive for chest pain. Negative for orthopnea, palpitations, paroxysmal nocturnal dyspnea and syncope.   Respiratory:  Positive for shortness of breath. Negative for cough and wheezing.    Endocrine: Negative for heat intolerance.   Skin:  Negative for rash.   Musculoskeletal:  " Negative for arthritis and myalgias.   Gastrointestinal:  Negative for abdominal pain, melena, nausea and vomiting.   Genitourinary:  Negative for dysuria and hematuria.   Neurological:  Negative for dizziness, light-headedness, numbness and tremors.   Psychiatric/Behavioral:  Negative for depression. The patient is not nervous/anxious.        Procedures    Procedures    No orders to display           Objective:    /98 (BP Location: Right arm, Patient Position: Sitting, Cuff Size: Large Adult)   Pulse 71   Resp 18   Ht 157.5 cm (62.01\")   Wt (!) 161 kg (354 lb)   LMP 12/27/2023   SpO2 99%   BMI 64.73 kg/m²         Constitutional:       Appearance: Well-developed.   Eyes:      General: No scleral icterus.        Right eye: No discharge.   HENT:      Head: Normocephalic and atraumatic.   Neck:      Thyroid: No thyromegaly.      Lymphadenopathy: No cervical adenopathy.   Pulmonary:      Effort: Pulmonary effort is normal. No respiratory distress.      Breath sounds: Normal breath sounds. No wheezing. No rales.   Cardiovascular:      Normal rate. Regular rhythm.      No gallop.    Edema:     Peripheral edema absent.   Abdominal:      Tenderness: There is no abdominal tenderness.   Skin:     Findings: No erythema or rash.   Neurological:      Mental Status: Alert and oriented to person, place, and time.             Assessment:       Diagnosis Plan   1. Anxiety  Adult Transthoracic Echo Complete W/ Cont if Necessary Per Protocol    CT Cardiac Calcium Score Without Dye      2. Hypertension, benign  losartan (COZAAR) 25 MG tablet    Adult Transthoracic Echo Complete W/ Cont if Necessary Per Protocol    CT Cardiac Calcium Score Without Dye      3. Atypical chest pain  Adult Transthoracic Echo Complete W/ Cont if Necessary Per Protocol    CT Cardiac Calcium Score Without Dye      4. Morbid obesity with BMI of 50.0-59.9, adult  Adult Transthoracic Echo Complete W/ Cont if Necessary Per Protocol    CT Cardiac " Calcium Score Without Dye               Plan:       MDM:    1.  Atypical chest pain:    Patient chest pain is probably noncardiac and atypical.  However I would like to exclude pericarditis.  I will proceed with echocardiogram.  Proceed with CT calcium scoring for coronary artery disease.  Continue symptomatic treatment    2.  Hypertension:    I would increase losartan to 25 mg daily.  Blood pressure monitoring is recommended.    3.  Anxiety disorder:    Patient is on Celexa and Xanax has been prescribed for as needed use

## 2024-01-11 ENCOUNTER — PATIENT ROUNDING (BHMG ONLY) (OUTPATIENT)
Dept: CARDIOLOGY | Facility: CLINIC | Age: 39
End: 2024-01-11
Payer: COMMERCIAL

## 2024-01-11 DIAGNOSIS — R73.03 BORDERLINE DIABETES: Primary | ICD-10-CM

## 2024-01-11 RX ORDER — CALCIUM CITRATE/VITAMIN D3 200MG-6.25
1 TABLET ORAL DAILY
Qty: 100 EACH | Refills: 3 | Status: SHIPPED | OUTPATIENT
Start: 2024-01-11

## 2024-01-11 RX ORDER — BLOOD-GLUCOSE METER
1 EACH MISCELLANEOUS DAILY
Qty: 1 KIT | Refills: 0 | Status: SHIPPED | OUTPATIENT
Start: 2024-01-11

## 2024-01-11 RX ORDER — LANCETS 28 GAUGE
1 EACH MISCELLANEOUS DAILY
Qty: 100 EACH | Refills: 3 | Status: SHIPPED | OUTPATIENT
Start: 2024-01-11

## 2024-01-11 NOTE — PROGRESS NOTES
A My-Chart message has been sent to the patient for PATIENT ROUNDING with [facility].

## 2024-01-15 ENCOUNTER — TELEPHONE (OUTPATIENT)
Dept: FAMILY MEDICINE CLINIC | Facility: CLINIC | Age: 39
End: 2024-01-15
Payer: COMMERCIAL

## 2024-01-15 NOTE — TELEPHONE ENCOUNTER
Received a fax from [facility] that [employer] is no longer covered pharmacist said they are wanting [employer] instead now for diabetic supplies

## 2024-01-18 DIAGNOSIS — R73.03 BORDERLINE DIABETES: Primary | ICD-10-CM

## 2024-01-18 RX ORDER — BLOOD-GLUCOSE METER
1 KIT MISCELLANEOUS DAILY
Qty: 1 EACH | Refills: 0 | Status: SHIPPED | OUTPATIENT
Start: 2024-01-18

## 2024-01-18 NOTE — ASSESSMENT & PLAN NOTE
Hypertension is improving with treatment.  Continue current treatment regimen.  Dietary sodium restriction.  Weight loss.  Blood pressure will be reassessed in 3 months.  She was on losartan.

## 2024-01-18 NOTE — ASSESSMENT & PLAN NOTE
Worse  Adding to her medical issues. Change to celexa    Good social support, no suicidal or homicidal ideation. Diagnosis and treatment discussed. Discussed counseling. Discussed medication, dosing and adverse effects. Return in 4 weeks    Diagnosis, treatment and and course discussed. Potential side effects discussed. Return if there is worsening or persistence of symptoms.

## 2024-01-22 ENCOUNTER — TELEPHONE (OUTPATIENT)
Dept: FAMILY MEDICINE CLINIC | Facility: CLINIC | Age: 39
End: 2024-01-22
Payer: COMMERCIAL

## 2024-01-22 NOTE — TELEPHONE ENCOUNTER
Pharmacy is asking to resent script as freestyle lite strips, that is what is covered they stated.

## 2024-01-25 DIAGNOSIS — E11.65 TYPE 2 DIABETES MELLITUS WITH HYPERGLYCEMIA, WITHOUT LONG-TERM CURRENT USE OF INSULIN: Primary | ICD-10-CM

## 2024-01-25 RX ORDER — BLOOD-GLUCOSE METER
1 KIT MISCELLANEOUS DAILY
Qty: 1 EACH | Refills: 0 | Status: SHIPPED | OUTPATIENT
Start: 2024-01-25

## 2024-01-25 RX ORDER — LANCETS 28 GAUGE
1 EACH MISCELLANEOUS DAILY
Qty: 100 EACH | Refills: 3 | Status: SHIPPED | OUTPATIENT
Start: 2024-01-25

## 2024-02-12 ENCOUNTER — OFFICE VISIT (OUTPATIENT)
Dept: FAMILY MEDICINE CLINIC | Facility: CLINIC | Age: 39
End: 2024-02-12
Payer: COMMERCIAL

## 2024-02-12 ENCOUNTER — HOSPITAL ENCOUNTER (OUTPATIENT)
Dept: CARDIOLOGY | Facility: HOSPITAL | Age: 39
Discharge: HOME | End: 2024-02-12
Admitting: INTERNAL MEDICINE
Payer: COMMERCIAL

## 2024-02-12 VITALS
OXYGEN SATURATION: 98 % | WEIGHT: 293 LBS | HEIGHT: 62 IN | RESPIRATION RATE: 18 BRPM | HEART RATE: 84 BPM | DIASTOLIC BLOOD PRESSURE: 86 MMHG | TEMPERATURE: 97.1 F | BODY MASS INDEX: 53.92 KG/M2 | SYSTOLIC BLOOD PRESSURE: 124 MMHG

## 2024-02-12 VITALS — WEIGHT: 293 LBS | HEIGHT: 62 IN | BODY MASS INDEX: 53.92 KG/M2

## 2024-02-12 DIAGNOSIS — F41.9 ANXIETY: ICD-10-CM

## 2024-02-12 DIAGNOSIS — I10 HYPERTENSION, BENIGN: ICD-10-CM

## 2024-02-12 DIAGNOSIS — F41.9 ANXIETY: Primary | ICD-10-CM

## 2024-02-12 DIAGNOSIS — E66.01 CLASS 3 SEVERE OBESITY DUE TO EXCESS CALORIES WITH SERIOUS COMORBIDITY AND BODY MASS INDEX (BMI) OF 60.0 TO 69.9 IN ADULT: ICD-10-CM

## 2024-02-12 DIAGNOSIS — R07.89 ATYPICAL CHEST PAIN: ICD-10-CM

## 2024-02-12 DIAGNOSIS — E66.01 MORBID OBESITY WITH BMI OF 50.0-59.9, ADULT: ICD-10-CM

## 2024-02-12 PROCEDURE — 99214 OFFICE O/P EST MOD 30 MIN: CPT | Performed by: FAMILY MEDICINE

## 2024-02-12 PROCEDURE — 93306 TTE W/DOPPLER COMPLETE: CPT

## 2024-02-12 RX ORDER — BUSPIRONE HYDROCHLORIDE 7.5 MG/1
7.5 TABLET ORAL 2 TIMES DAILY
Qty: 60 TABLET | Refills: 3 | Status: SHIPPED | OUTPATIENT
Start: 2024-02-12

## 2024-02-12 RX ORDER — ALPRAZOLAM 0.25 MG/1
0.25 TABLET ORAL 3 TIMES DAILY PRN
Status: CANCELLED | OUTPATIENT
Start: 2024-02-12

## 2024-02-18 LAB
BH CV ECHO MEAS - ACS: 2.17 CM
BH CV ECHO MEAS - AO MAX PG: 10.5 MMHG
BH CV ECHO MEAS - AO MEAN PG: 5.7 MMHG
BH CV ECHO MEAS - AO ROOT DIAM: 2.6 CM
BH CV ECHO MEAS - AO V2 MAX: 162.1 CM/SEC
BH CV ECHO MEAS - AO V2 VTI: 35.4 CM
BH CV ECHO MEAS - AVA(I,D): 3 CM2
BH CV ECHO MEAS - EDV(CUBED): 166 ML
BH CV ECHO MEAS - EDV(MOD-SP4): 96.3 ML
BH CV ECHO MEAS - EF(MOD-BP): 65 %
BH CV ECHO MEAS - EF(MOD-SP4): 73.8 %
BH CV ECHO MEAS - ESV(CUBED): 39.3 ML
BH CV ECHO MEAS - ESV(MOD-SP4): 25.2 ML
BH CV ECHO MEAS - FS: 38.1 %
BH CV ECHO MEAS - IVS/LVPW: 0.96 CM
BH CV ECHO MEAS - IVSD: 1.05 CM
BH CV ECHO MEAS - LA DIMENSION: 4 CM
BH CV ECHO MEAS - LV DIASTOLIC VOL/BSA (35-75): 39.5 CM2
BH CV ECHO MEAS - LV MASS(C)D: 233.4 GRAMS
BH CV ECHO MEAS - LV MAX PG: 7.2 MMHG
BH CV ECHO MEAS - LV MEAN PG: 3 MMHG
BH CV ECHO MEAS - LV SYSTOLIC VOL/BSA (12-30): 10.3 CM2
BH CV ECHO MEAS - LV V1 MAX: 134.4 CM/SEC
BH CV ECHO MEAS - LV V1 VTI: 28.8 CM
BH CV ECHO MEAS - LVIDD: 5.5 CM
BH CV ECHO MEAS - LVIDS: 3.4 CM
BH CV ECHO MEAS - LVOT AREA: 3.7 CM2
BH CV ECHO MEAS - LVOT DIAM: 2.16 CM
BH CV ECHO MEAS - LVPWD: 1.09 CM
BH CV ECHO MEAS - MR MAX PG: 80.2 MMHG
BH CV ECHO MEAS - MR MAX VEL: 447.8 CM/SEC
BH CV ECHO MEAS - MV A MAX VEL: 82.8 CM/SEC
BH CV ECHO MEAS - MV DEC SLOPE: 482.2 CM/SEC2
BH CV ECHO MEAS - MV DEC TIME: 0.24 SEC
BH CV ECHO MEAS - MV E MAX VEL: 116.7 CM/SEC
BH CV ECHO MEAS - MV E/A: 1.41
BH CV ECHO MEAS - MV MAX PG: 8.8 MMHG
BH CV ECHO MEAS - MV MEAN PG: 3.6 MMHG
BH CV ECHO MEAS - MV V2 VTI: 45.8 CM
BH CV ECHO MEAS - MVA(VTI): 2.3 CM2
BH CV ECHO MEAS - PA ACC TIME: 0.09 SEC
BH CV ECHO MEAS - PA V2 MAX: 120.7 CM/SEC
BH CV ECHO MEAS - PI END-D VEL: 148 CM/SEC
BH CV ECHO MEAS - PULM A REVS DUR: 0.1 SEC
BH CV ECHO MEAS - PULM A REVS VEL: 30.2 CM/SEC
BH CV ECHO MEAS - PULM DIAS VEL: 56.1 CM/SEC
BH CV ECHO MEAS - PULM S/D: 1.17
BH CV ECHO MEAS - PULM SYS VEL: 65.5 CM/SEC
BH CV ECHO MEAS - RAP SYSTOLE: 10 MMHG
BH CV ECHO MEAS - RVSP: 53.1 MMHG
BH CV ECHO MEAS - SI(MOD-SP4): 29.2 ML/M2
BH CV ECHO MEAS - SV(LVOT): 105.3 ML
BH CV ECHO MEAS - SV(MOD-SP4): 71.1 ML
BH CV ECHO MEAS - TAPSE (>1.6): 3.4 CM
BH CV ECHO MEAS - TR MAX PG: 43.1 MMHG
BH CV ECHO MEAS - TR MAX VEL: 327.7 CM/SEC
BH CV XLRA - RV BASE: 3.8 CM
BH CV XLRA - RV MID: 2.1 CM

## 2024-02-21 ENCOUNTER — HOSPITAL ENCOUNTER (OUTPATIENT)
Dept: CT IMAGING | Facility: HOSPITAL | Age: 39
Discharge: HOME | End: 2024-02-21
Admitting: INTERNAL MEDICINE

## 2024-02-21 DIAGNOSIS — R07.89 ATYPICAL CHEST PAIN: ICD-10-CM

## 2024-02-21 DIAGNOSIS — F41.9 ANXIETY: ICD-10-CM

## 2024-02-21 DIAGNOSIS — E66.01 MORBID OBESITY WITH BMI OF 50.0-59.9, ADULT: ICD-10-CM

## 2024-02-21 DIAGNOSIS — I10 HYPERTENSION, BENIGN: ICD-10-CM

## 2024-02-21 PROCEDURE — 75571 CT HRT W/O DYE W/CA TEST: CPT

## 2024-02-23 ENCOUNTER — TELEPHONE (OUTPATIENT)
Dept: CARDIOLOGY | Facility: CLINIC | Age: 39
End: 2024-02-23
Payer: COMMERCIAL

## 2024-03-11 ENCOUNTER — TELEPHONE (OUTPATIENT)
Dept: FAMILY MEDICINE CLINIC | Facility: CLINIC | Age: 39
End: 2024-03-11
Payer: COMMERCIAL

## 2024-04-26 DIAGNOSIS — I10 HYPERTENSION, BENIGN: ICD-10-CM

## 2024-04-29 NOTE — PROGRESS NOTES
Subjective   Yoselin Gerard is a 39 y.o. female. Presents to [facility]    Chief Complaint   Patient presents with    Anxiety       Anxiety  Presents for follow-up visit.  Symptoms include decreased concentration, depressed mood, excessive worry, insomnia, irritability, nausea, nervous/anxious behavior, palpitations, panic and restlessness.  Patient reports no chest pain, dizziness, dry mouth, feeling of choking, hyperventilation, obsessions or suicidal ideas. Symptoms occur most days. The severity of symptoms is moderate. The patient sleeps 3 hours per night. The quality of sleep is poor. Awakens several during the night. Compliance with medications is %.   Hypertension  This is a chronic problem. The current episode started more than 1 year ago. The problem has been stable since onset. Associated symptoms include anxiety and palpitations. Pertinent negatives include no chest pain. Risk factors for coronary artery disease include obesity. Current antihypertension treatment includes angiotensin blockers. The current treatment provides moderate improvement. Compliance problems include diet and exercise.    Diabetes  She presents for her follow-up diabetic visit. She has type 2 diabetes mellitus. Onset time: months. Hypoglycemia symptoms include nervousness/anxiousness. Pertinent negatives for hypoglycemia include no dizziness. Pertinent negatives for diabetes include no chest pain. Pertinent negatives for hypoglycemia complications include no blackouts and no hospitalization. Risk factors for coronary artery disease include obesity. Current diabetic treatment includes oral agent (monotherapy).      She has not been taking her anxiety meds for a while.     I personally reviewed and updated the patient's allergies, medications, problem list, and past medical, surgical, social, and family history. I have reviewed and confirmed the accuracy of the History of Present Illness and Review of Symptoms  as documented by the MA/LPN/RN. Josefa Skelton MD    Allergies:  Allergies   Allergen Reactions    Penicillins Rash and Swelling    Hydrocodone Other (See Comments)     Numbness       Social History:  Social History     Socioeconomic History    Marital status:    Tobacco Use    Smoking status: Never    Smokeless tobacco: Never   Vaping Use    Vaping status: Never Used   Substance and Sexual Activity    Alcohol use: Not Currently    Drug use: Not Currently    Sexual activity: Defer       Family History:  Family History   Problem Relation Age of Onset    Heart attack Mother     Heart disease Mother     Heart disease Maternal Grandfather        Past Medical History :  Patient Active Problem List   Diagnosis    Anemia    Anxiety    Moderate episode of recurrent major depressive disorder    Gastroesophageal reflux disease, esophagitis presence not specified    Heartburn    Hypertension, benign    Moderate persistent asthma without complication    Plantar fasciitis    Palpitations    Osteoarthritis    Lumbosacral radiculopathy    Lumbar disc herniation with radiculopathy    Lower extremity edema    Chronic right-sided low back pain with right-sided sciatica    Excessive sweating    Encounter for wellness examination    Hot flashes    Migraine with aura and without status migrainosus, not intractable    Class 3 severe obesity due to excess calories with serious comorbidity and body mass index (BMI) of 60.0 to 69.9 in adult    Skin lesion of neck    Mixed hyperlipidemia    Pap smear of cervix with ASCUS, cannot exclude HGSIL    Rh D negative blood type    Chlamydia vaginitis/cervicitis    Irregular menses    Type 2 diabetes mellitus with hyperglycemia, without long-term current use of insulin    Atypical chest pain    Lung nodule    Nevus       Medication List:    Current Outpatient Medications:     Blood Glucose Monitoring Suppl (FreeStyle Lite) device, Use 1 each Daily., Disp: 1 each, Rfl: 0    busPIRone  "(BUSPAR) 7.5 MG tablet, Take 1 tablet by mouth 2 (Two) Times a Day. 1 daily for 7 days, then BID., Disp: 180 tablet, Rfl: 3    citalopram (CeleXA) 40 MG tablet, Take 1 tablet by mouth Daily., Disp: 90 tablet, Rfl: 3    glucose blood test strip, 1 each by Other route Daily. Use as instructed, Disp: 100 each, Rfl: 3    glucose monitor monitoring kit, Use 1 each Daily., Disp: 1 each, Rfl: 0    ibuprofen (ADVIL,MOTRIN) 600 MG tablet, Take 1 tablet by mouth 3 times a day., Disp: , Rfl:     Lancets (freestyle) lancets, 1 each by Other route Daily., Disp: 100 each, Rfl: 3    metFORMIN (GLUCOPHAGE) 500 MG tablet, Take 1 tablet by mouth 2 (Two) Times a Day With Meals., Disp: 180 tablet, Rfl: 3    rosuvastatin (CRESTOR) 5 MG tablet, Take 1 tablet by mouth Daily., Disp: 30 tablet, Rfl: 12    thiamine (VITAMIN B-1) 100 MG tablet  tablet, Take 1 tablet by mouth Daily., Disp: , Rfl:     topiramate (TOPAMAX) 25 MG tablet, TAKE 1 TABLET BY MOUTH TWICE A DAY, Disp: 180 tablet, Rfl: 4    vitamin B-12 (CYANOCOBALAMIN) 1000 MCG tablet, Take 1 tablet by mouth Daily., Disp: , Rfl:     losartan (COZAAR) 25 MG tablet, TAKE 1/2 TABLET BY MOUTH EVERY DAY, Disp: 30 tablet, Rfl: 6    Past Surgical History:  Past Surgical History:   Procedure Laterality Date    CARPAL TUNNEL RELEASE       SECTION      3    TUBAL ABDOMINAL LIGATION           Physical Exam:      Vital Signs:    Vitals:    24 1633   BP: 132/84   Pulse: 92   Resp: 19   Temp: 97.7 °F (36.5 °C)   SpO2: 97%        /84 (BP Location: Right arm, Patient Position: Sitting, Cuff Size: Large Adult)   Pulse 92   Temp 97.7 °F (36.5 °C) (Temporal)   Resp 19   Ht 157.5 cm (62.01\")   Wt (!) 164 kg (361 lb 12.8 oz)   LMP 2024   SpO2 97% Comment: room air  BMI 66.15 kg/m²     Wt Readings from Last 3 Encounters:   24 (!) 164 kg (361 lb 12.8 oz)   24 (!) 161 kg (354 lb)   24 (!) 162 kg (356 lb 12.8 oz)       Result Review :   The following data was " reviewed by: Josefa Skelton MD on 04/30/2024:  A1C Last 3 Results          6/12/2023    14:56 1/8/2024    12:03 4/30/2024    16:52   HGBA1C Last 3 Results   Hemoglobin A1C 6.2  6.8  6.2               Physical Exam  Vitals reviewed.   Constitutional:       Appearance: Normal appearance. She is well-developed.   HENT:      Head: Normocephalic and atraumatic.   Eyes:      General:         Right eye: No discharge.         Left eye: No discharge.   Cardiovascular:      Rate and Rhythm: Normal rate and regular rhythm.      Heart sounds: Normal heart sounds. No murmur heard.     No friction rub. No gallop.   Pulmonary:      Effort: Pulmonary effort is normal. No respiratory distress.      Breath sounds: Normal breath sounds. No wheezing or rales.   Skin:     General: Skin is warm and dry.      Findings: No rash.      Comments: Small brown nevus right neck   Neurological:      Mental Status: She is alert and oriented to person, place, and time.      Coordination: Coordination normal.      Gait: Gait normal.   Psychiatric:         Behavior: Behavior is cooperative.         Assessment and Plan:  Problems Addressed this Visit          Cardiac and Vasculature    Hypertension, benign     Hypertension is stable and controlled  Continue current treatment regimen.  Blood pressure will be reassessed in 3 months.         Mixed hyperlipidemia      She is on crestor  Will get labs         Relevant Orders    Comprehensive Metabolic Panel    Lipid Panel With / Chol / HDL Ratio       Endocrine and Metabolic    Class 3 severe obesity due to excess calories with serious comorbidity and body mass index (BMI) of 60.0 to 69.9 in adult    Type 2 diabetes mellitus with hyperglycemia, without long-term current use of insulin     Diabetes is improving with treatment.   Continue current treatment regimen.  Diabetes will be reassessed in 3 months         Relevant Medications    metFORMIN (GLUCOPHAGE) 500 MG tablet    Other Relevant Orders    POC  Glycosylated Hemoglobin (Hb A1C) (Completed)    POC Urinalysis Dipstick, Multipro (Completed)    POC Microalbumin (Completed)       Mental Health    Anxiety - Primary     Better with buspar  Continue current treatment         Relevant Medications    citalopram (CeleXA) 40 MG tablet    busPIRone (BUSPAR) 7.5 MG tablet       Pulmonary and Pneumonias    Lung nodule    Relevant Orders    NM PET/CT Skull Base to Mid Thigh       Skin    Nevus     Right lateral neck  Referral to derm           Relevant Orders    Ambulatory Referral to Dermatology (Completed)     Other Visit Diagnoses       Borderline diabetes        Relevant Medications    metFORMIN (GLUCOPHAGE) 500 MG tablet          Diagnoses         Codes Comments    Anxiety    -  Primary ICD-10-CM: F41.9  ICD-9-CM: 300.00     Class 3 severe obesity due to excess calories with serious comorbidity and body mass index (BMI) of 60.0 to 69.9 in adult     ICD-10-CM: E66.01, Z68.44  ICD-9-CM: 278.01, V85.44     Hypertension, benign     ICD-10-CM: I10  ICD-9-CM: 401.1     Mixed hyperlipidemia     ICD-10-CM: E78.2  ICD-9-CM: 272.2     Type 2 diabetes mellitus with hyperglycemia, without long-term current use of insulin     ICD-10-CM: E11.65  ICD-9-CM: 250.00, 790.29     Borderline diabetes     ICD-10-CM: R73.03  ICD-9-CM: 790.29     Lung nodule     ICD-10-CM: R91.1  ICD-9-CM: 793.11     Nevus     ICD-10-CM: D22.9  ICD-9-CM: 216.9                          An After Visit Summary and PPPS were given to the patient.

## 2024-04-30 ENCOUNTER — OFFICE VISIT (OUTPATIENT)
Dept: FAMILY MEDICINE CLINIC | Facility: CLINIC | Age: 39
End: 2024-04-30
Payer: COMMERCIAL

## 2024-04-30 VITALS
DIASTOLIC BLOOD PRESSURE: 84 MMHG | HEIGHT: 62 IN | TEMPERATURE: 97.7 F | WEIGHT: 293 LBS | SYSTOLIC BLOOD PRESSURE: 132 MMHG | HEART RATE: 92 BPM | BODY MASS INDEX: 53.92 KG/M2 | OXYGEN SATURATION: 97 % | RESPIRATION RATE: 19 BRPM

## 2024-04-30 DIAGNOSIS — D22.9 NEVUS: ICD-10-CM

## 2024-04-30 DIAGNOSIS — R91.1 LUNG NODULE: ICD-10-CM

## 2024-04-30 DIAGNOSIS — E11.65 TYPE 2 DIABETES MELLITUS WITH HYPERGLYCEMIA, WITHOUT LONG-TERM CURRENT USE OF INSULIN: ICD-10-CM

## 2024-04-30 DIAGNOSIS — E78.2 MIXED HYPERLIPIDEMIA: ICD-10-CM

## 2024-04-30 DIAGNOSIS — I10 HYPERTENSION, BENIGN: ICD-10-CM

## 2024-04-30 DIAGNOSIS — R73.03 BORDERLINE DIABETES: ICD-10-CM

## 2024-04-30 DIAGNOSIS — F41.9 ANXIETY: Primary | ICD-10-CM

## 2024-04-30 DIAGNOSIS — E66.01 CLASS 3 SEVERE OBESITY DUE TO EXCESS CALORIES WITH SERIOUS COMORBIDITY AND BODY MASS INDEX (BMI) OF 60.0 TO 69.9 IN ADULT: ICD-10-CM

## 2024-04-30 LAB
BILIRUB BLD-MCNC: NEGATIVE MG/DL
CLARITY, POC: CLEAR
COLOR UR: YELLOW
EXPIRATION DATE: ABNORMAL
EXPIRATION DATE: NORMAL
GLUCOSE UR STRIP-MCNC: NEGATIVE MG/DL
HBA1C MFR BLD: 6.2 % (ref 4.5–5.7)
KETONES UR QL: NEGATIVE
LEUKOCYTE EST, POC: NEGATIVE
Lab: ABNORMAL
Lab: NORMAL
NITRITE UR-MCNC: NEGATIVE MG/ML
PH UR: 6 [PH] (ref 5–8)
POC MICROALBUMIN URINE: 0
PROT UR STRIP-MCNC: NEGATIVE MG/DL
RBC # UR STRIP: NEGATIVE /UL
SP GR UR: 1.02 (ref 1–1.03)
UROBILINOGEN UR QL: NORMAL

## 2024-04-30 PROCEDURE — 83036 HEMOGLOBIN GLYCOSYLATED A1C: CPT | Performed by: FAMILY MEDICINE

## 2024-04-30 PROCEDURE — 82044 UR ALBUMIN SEMIQUANTITATIVE: CPT | Performed by: FAMILY MEDICINE

## 2024-04-30 PROCEDURE — 81003 URINALYSIS AUTO W/O SCOPE: CPT | Performed by: FAMILY MEDICINE

## 2024-04-30 PROCEDURE — 99214 OFFICE O/P EST MOD 30 MIN: CPT | Performed by: FAMILY MEDICINE

## 2024-04-30 RX ORDER — BUSPIRONE HYDROCHLORIDE 7.5 MG/1
7.5 TABLET ORAL 2 TIMES DAILY
Qty: 180 TABLET | Refills: 3 | Status: SHIPPED | OUTPATIENT
Start: 2024-04-30

## 2024-04-30 RX ORDER — CITALOPRAM 40 MG/1
40 TABLET ORAL DAILY
Qty: 90 TABLET | Refills: 3 | Status: SHIPPED | OUTPATIENT
Start: 2024-04-30

## 2024-05-03 RX ORDER — LOSARTAN POTASSIUM 25 MG/1
12.5 TABLET ORAL DAILY
Qty: 30 TABLET | Refills: 6 | Status: SHIPPED | OUTPATIENT
Start: 2024-05-03

## 2024-05-14 NOTE — ASSESSMENT & PLAN NOTE
Diabetes is improving with treatment.   Continue current treatment regimen.  Diabetes will be reassessed in 3 months

## 2024-05-15 LAB
ALBUMIN SERPL-MCNC: 3.9 G/DL (ref 3.9–4.9)
ALBUMIN/GLOB SERPL: 1.4 {RATIO} (ref 1.2–2.2)
ALP SERPL-CCNC: 66 IU/L (ref 44–121)
ALT SERPL-CCNC: 16 IU/L (ref 0–32)
AST SERPL-CCNC: 16 IU/L (ref 0–40)
BILIRUB SERPL-MCNC: 0.3 MG/DL (ref 0–1.2)
BUN SERPL-MCNC: 10 MG/DL (ref 6–20)
BUN/CREAT SERPL: 13 (ref 9–23)
CALCIUM SERPL-MCNC: 9 MG/DL (ref 8.7–10.2)
CHLORIDE SERPL-SCNC: 102 MMOL/L (ref 96–106)
CHOLEST SERPL-MCNC: 176 MG/DL (ref 100–199)
CHOLEST/HDLC SERPL: 3.3 RATIO (ref 0–4.4)
CO2 SERPL-SCNC: 21 MMOL/L (ref 20–29)
CREAT SERPL-MCNC: 0.76 MG/DL (ref 0.57–1)
EGFRCR SERPLBLD CKD-EPI 2021: 103 ML/MIN/1.73
GLOBULIN SER CALC-MCNC: 2.7 G/DL (ref 1.5–4.5)
GLUCOSE SERPL-MCNC: 120 MG/DL (ref 70–99)
HDLC SERPL-MCNC: 54 MG/DL
LDLC SERPL CALC-MCNC: 103 MG/DL (ref 0–99)
POTASSIUM SERPL-SCNC: 4.7 MMOL/L (ref 3.5–5.2)
PROT SERPL-MCNC: 6.6 G/DL (ref 6–8.5)
SODIUM SERPL-SCNC: 139 MMOL/L (ref 134–144)
TRIGL SERPL-MCNC: 104 MG/DL (ref 0–149)
VLDLC SERPL CALC-MCNC: 19 MG/DL (ref 5–40)

## 2024-05-20 NOTE — PROGRESS NOTES
"  Chief Complaint   Patient presents with    Annual Exam    Hypertension    Hyperlipidemia    Blood Sugar Problem         Subjective   Yoselin Gerard is a 39 y.o. female here for a Annual Visit.     Last Physical Exam: 02/27/23 Previous physical was performed by  Josefa Skelton MD  General Health:fair  Contraceptive History:none  Nutrition:in general, an \"unhealthy\" diet  Exercise Level:not at all  Sleep:poorly  Hours of Sleep:4  Libido:fair  Self Skin Exam: monthly  Monthly Breast Self Exam:Performs monthly self breast exam    Pap Smear:  Last Completed Pap Smear       This patient has no relevant Health Maintenance data.         Findings on last pap: approximate date 2023 and was normal}Dr. Duncan    Mammogram:   Last Completed Mammogram       This patient has no relevant Health Maintenance data.         she has never had a mammogram    Bone Dexa scan: None    Colonoscopy:   Last Completed Colonoscopy       This patient has no relevant Health Maintenance data.          none         I personally reviewed and updated the patient's allergies, medications, problem list, and past medical, surgical, social, and family history.     Allergies:  Allergies   Allergen Reactions    Penicillins Rash and Swelling    Hydrocodone Other (See Comments)     Numbness       Social History:  Social History     Socioeconomic History    Marital status:    Tobacco Use    Smoking status: Never    Smokeless tobacco: Never   Vaping Use    Vaping status: Never Used   Substance and Sexual Activity    Alcohol use: Not Currently    Drug use: Not Currently    Sexual activity: Defer       Family History:  Family History   Problem Relation Age of Onset    Heart attack Mother     Heart disease Mother     Heart disease Maternal Grandfather        Past Medical History :  Active Ambulatory Problems     Diagnosis Date Noted    Anemia 08/09/2021    Anxiety 08/09/2021    Moderate episode of recurrent major depressive disorder 08/09/2021    " Gastroesophageal reflux disease, esophagitis presence not specified 08/09/2021    Heartburn 12/10/2018    Hypertension, benign 08/09/2021    Moderate persistent asthma without complication 08/09/2021    Plantar fasciitis 08/09/2021    Palpitations 10/10/2018    Osteoarthritis 07/18/2018    Lumbosacral radiculopathy 07/18/2018    Lumbar disc herniation with radiculopathy 07/18/2018    Lower extremity edema 08/09/2021    Chronic right-sided low back pain with right-sided sciatica 07/18/2018    Excessive sweating 08/10/2021    Encounter for general adult medical examination with abnormal findings 08/10/2021    Hot flashes 03/29/2022    Migraine with aura and without status migrainosus, not intractable 06/20/2022    Class 3 severe obesity due to excess calories with serious comorbidity and body mass index (BMI) of 60.0 to 69.9 in adult 07/18/2022    Skin lesion of neck 01/25/2023    Mixed hyperlipidemia 01/25/2023    Pap smear of cervix with ASCUS, cannot exclude HGSIL 06/30/2023    Rh D negative blood type 06/30/2023    Chlamydia vaginitis/cervicitis 06/30/2023    Irregular menses 06/30/2023    Type 2 diabetes mellitus with hyperglycemia, without long-term current use of insulin 06/30/2023    Atypical chest pain 01/08/2024    Lung nodule 04/30/2024    Nevus 04/30/2024     Resolved Ambulatory Problems     Diagnosis Date Noted    Acute bacterial bronchitis 08/09/2021    Acute right-sided thoracic back pain 08/09/2021    Reactive airway disease 07/18/2018    Otalgia of both ears 03/29/2022    Cough 03/29/2022    Acute non-recurrent maxillary sinusitis 03/29/2022    Weight gain 06/20/2022    Screening for hyperlipidemia 06/20/2022    Hyperglycemia 06/08/2023    Insect bite of right ankle 07/18/2023     Past Medical History:   Diagnosis Date    Hyperlipidemia     Hypertension     PTSD (post-traumatic stress disorder)        Medication List:    Current Outpatient Medications:     Blood Glucose Monitoring Suppl (FreeStyle  "Lite) device, Use 1 each Daily., Disp: 1 each, Rfl: 0    citalopram (CeleXA) 40 MG tablet, Take 1 tablet by mouth Daily., Disp: 90 tablet, Rfl: 3    glucose blood test strip, 1 each by Other route Daily. Use as instructed, Disp: 100 each, Rfl: 3    glucose monitor monitoring kit, Use 1 each Daily., Disp: 1 each, Rfl: 0    ibuprofen (ADVIL,MOTRIN) 600 MG tablet, Take 1 tablet by mouth 3 times a day., Disp: , Rfl:     Lancets (freestyle) lancets, 1 each by Other route Daily., Disp: 100 each, Rfl: 3    losartan (COZAAR) 25 MG tablet, TAKE 1/2 TABLET BY MOUTH EVERY DAY, Disp: 30 tablet, Rfl: 6    metFORMIN (GLUCOPHAGE) 500 MG tablet, Take 1 tablet by mouth 2 (Two) Times a Day With Meals., Disp: 180 tablet, Rfl: 3    rosuvastatin (CRESTOR) 10 MG tablet, Take 1 tablet by mouth Every Night., Disp: 90 tablet, Rfl: 3    thiamine (VITAMIN B-1) 100 MG tablet  tablet, Take 1 tablet by mouth Daily., Disp: , Rfl:     vitamin B-12 (CYANOCOBALAMIN) 1000 MCG tablet, Take 1 tablet by mouth Daily., Disp: , Rfl:     Past Surgical History:  Past Surgical History:   Procedure Laterality Date    CARPAL TUNNEL RELEASE       SECTION      3    TUBAL ABDOMINAL LIGATION         Depression Screen:       2024    11:44 AM   PHQ-2/PHQ-9 Depression Screening   Little Interest or Pleasure in Doing Things 0-->not at all   Feeling Down, Depressed or Hopeless 0-->not at all   PHQ-9: Brief Depression Severity Measure Score 0       Fall Risk Screen:  [facility].        Physical Exam:  Vital Signs:  Visit Vitals  /80 (BP Location: Right arm, Patient Position: Sitting, Cuff Size: Large Adult)   Pulse 77   Temp 97.7 °F (36.5 °C) (Temporal)   Resp 18   Ht 157.5 cm (62.01\")   Wt (!) 163 kg (359 lb 6.4 oz)   LMP 2024   SpO2 98% Comment: room air   BMI 65.71 kg/m²       Body mass index is 65.71 kg/m².      Result Review :   The following data was reviewed by: Josefa Skelton MD on " 05/21/2024:    Lipid Panel          6/12/2023    14:56 5/14/2024    09:01   Lipid Panel   Total Cholesterol 215  176    Triglycerides 89  104    HDL Cholesterol 63  54    VLDL Cholesterol 16  19    LDL Cholesterol  136  103                 Assessment and Plan:  Problem List Items Addressed This Visit          Cardiac and Vasculature    Hypertension, benign    Current Assessment & Plan     Hypertension is stable and controlled  Continue current treatment regimen.  Dietary sodium restriction.  Weight loss.  Blood pressure will be reassessed in 3 months.         Mixed hyperlipidemia    Current Assessment & Plan      Lipid abnormalities are worsening    Plan:  Increase crestor to 10mg.      Discussed medication dosage, use, side effects, and goals of treatment in detail.    Counseled patient on lifestyle modifications to help control hyperlipidemia.     Patient Treatment Goals:   LDL goal is under 130    Followup in 3 months.         Relevant Medications    rosuvastatin (CRESTOR) 10 MG tablet       Endocrine and Metabolic    Class 3 severe obesity due to excess calories with serious comorbidity and body mass index (BMI) of 60.0 to 69.9 in adult    Type 2 diabetes mellitus with hyperglycemia, without long-term current use of insulin    Current Assessment & Plan     Diabetes is stable.   Continue current treatment regimen.  Recommended a Mediterranean style of eating  Diabetes will be reassessed in 3 months            Health Encounters    Encounter for general adult medical examination with abnormal findings - Primary       Skin    Nevus    Current Assessment & Plan     Right lateral neck. Referral to derm                    An After Visit Summary and PPPS were given to the patient.       Discussed injury prevention, diet and exercise, safe sexual practices, and screening for common diseases. Encouraged use of sunscreen and seatbelts. Discussed timing of  cervical cancer screening. Encouraged monthly self-breast exams,  yearly clinical breast exams, and discussed timing of mammograms. Avoidance of tobacco encouraged. Limitation or avoidance of alcohol encouraged. Recommend yearly dental and eye exams. Also discussed monitoring of blood pressure, lipids.         +++++E/M portion medically necessary secondary to new or uncontrolled chronic problem+++++++    Subjective   Yoselin Gerard is here for:    Chief Complaint   Patient presents with    Annual Exam    Hypertension    Hyperlipidemia    Blood Sugar Problem       Hypertension  This is a chronic problem. The current episode started more than 1 year ago. The problem is controlled. Pertinent negatives include no malaise/fatigue or shortness of breath. Risk factors for coronary artery disease include dyslipidemia, obesity and family history. Current antihypertension treatment includes angiotensin blockers. The current treatment provides moderate improvement.   Hyperlipidemia  This is a chronic problem. The current episode started more than 1 year ago. Exacerbating diseases include diabetes and obesity. Pertinent negatives include no shortness of breath. She is currently on no antihyperlipidemic treatment. The current treatment provides mild improvement of lipids. Risk factors for coronary artery disease include dyslipidemia, hypertension, obesity and family history.   Blood Sugar Problem  This is a chronic problem. The current episode started more than 1 month ago. The problem has been unchanged. Pertinent negatives include no abdominal pain, fatigue or visual change. Treatments tried: metformin. The treatment provided moderate relief.         Physical Exam:  Review of Systems   Constitutional:  Negative for fatigue and malaise/fatigue.   Respiratory:  Negative for shortness of breath.    Gastrointestinal:  Negative for abdominal pain.        Physical Exam  Feet:      Right foot:      Protective Sensation: 10 sites tested.  10 sites sensed.      Skin integrity: Callus and dry skin  present.      Toenail Condition: Right toenails are normal.      Left foot:      Protective Sensation: 10 sites tested.  10 sites sensed.      Skin integrity: Callus and dry skin present.      Toenail Condition: Left toenails are normal.     Diabetic Foot Exam Performed and Monofilament Test Performed   Assessment and Plan:  Problem List Items Addressed This Visit          Cardiac and Vasculature    Hypertension, benign    Current Assessment & Plan     Hypertension is stable and controlled  Continue current treatment regimen.  Dietary sodium restriction.  Weight loss.  Blood pressure will be reassessed in 3 months.         Mixed hyperlipidemia    Current Assessment & Plan      Lipid abnormalities are worsening    Plan:  Increase crestor to 10mg.      Discussed medication dosage, use, side effects, and goals of treatment in detail.    Counseled patient on lifestyle modifications to help control hyperlipidemia.     Patient Treatment Goals:   LDL goal is under 130    Followup in 3 months.         Relevant Medications    rosuvastatin (CRESTOR) 10 MG tablet       Endocrine and Metabolic    Class 3 severe obesity due to excess calories with serious comorbidity and body mass index (BMI) of 60.0 to 69.9 in adult    Type 2 diabetes mellitus with hyperglycemia, without long-term current use of insulin    Current Assessment & Plan     Diabetes is stable.   Continue current treatment regimen.  Recommended a Mediterranean style of eating  Diabetes will be reassessed in 3 months            Health Encounters    Encounter for general adult medical examination with abnormal findings - Primary       Skin    Nevus    Current Assessment & Plan     Right lateral neck. Referral to derm                      An After Visit Summary and PPPS were given to the patient.       Discussed injury prevention, diet and exercise, safe sexual practices, and screening for common diseases. Encouraged use of sunscreen and seatbelts. Discussed timing of   cervical cancer screening. Encouraged monthly self-breast exams, yearly clinical breast exams, and discussed timing of mammograms. Avoidance of tobacco encouraged. Limitation or avoidance of alcohol encouraged. Recommend yearly dental and eye exams. Also discussed monitoring of blood pressure, lipids.

## 2024-05-21 ENCOUNTER — OFFICE VISIT (OUTPATIENT)
Dept: FAMILY MEDICINE CLINIC | Facility: CLINIC | Age: 39
End: 2024-05-21
Payer: COMMERCIAL

## 2024-05-21 VITALS
RESPIRATION RATE: 18 BRPM | DIASTOLIC BLOOD PRESSURE: 80 MMHG | HEART RATE: 77 BPM | HEIGHT: 62 IN | WEIGHT: 293 LBS | SYSTOLIC BLOOD PRESSURE: 132 MMHG | OXYGEN SATURATION: 98 % | BODY MASS INDEX: 53.92 KG/M2 | TEMPERATURE: 97.7 F

## 2024-05-21 DIAGNOSIS — E78.2 MIXED HYPERLIPIDEMIA: ICD-10-CM

## 2024-05-21 DIAGNOSIS — D22.9 NEVUS: ICD-10-CM

## 2024-05-21 DIAGNOSIS — I10 HYPERTENSION, BENIGN: ICD-10-CM

## 2024-05-21 DIAGNOSIS — E11.65 TYPE 2 DIABETES MELLITUS WITH HYPERGLYCEMIA, WITHOUT LONG-TERM CURRENT USE OF INSULIN: ICD-10-CM

## 2024-05-21 DIAGNOSIS — Z00.01 ENCOUNTER FOR GENERAL ADULT MEDICAL EXAMINATION WITH ABNORMAL FINDINGS: Primary | ICD-10-CM

## 2024-05-21 DIAGNOSIS — E66.01 CLASS 3 SEVERE OBESITY DUE TO EXCESS CALORIES WITH SERIOUS COMORBIDITY AND BODY MASS INDEX (BMI) OF 60.0 TO 69.9 IN ADULT: ICD-10-CM

## 2024-05-21 PROCEDURE — 99395 PREV VISIT EST AGE 18-39: CPT | Performed by: FAMILY MEDICINE

## 2024-05-21 PROCEDURE — 99214 OFFICE O/P EST MOD 30 MIN: CPT | Performed by: FAMILY MEDICINE

## 2024-05-21 RX ORDER — ROSUVASTATIN CALCIUM 10 MG/1
10 TABLET, COATED ORAL NIGHTLY
Qty: 90 TABLET | Refills: 3 | Status: SHIPPED | OUTPATIENT
Start: 2024-05-21

## 2024-05-28 PROBLEM — Z00.01 ENCOUNTER FOR GENERAL ADULT MEDICAL EXAMINATION WITH ABNORMAL FINDINGS: Status: ACTIVE | Noted: 2021-08-10

## 2024-05-29 NOTE — ASSESSMENT & PLAN NOTE
Diabetes is stable.   Continue current treatment regimen.  Recommended a Mediterranean style of eating  Diabetes will be reassessed in 3 months

## 2024-05-29 NOTE — ASSESSMENT & PLAN NOTE
Lipid abnormalities are worsening    Plan:  Increase crestor to 10mg.      Discussed medication dosage, use, side effects, and goals of treatment in detail.    Counseled patient on lifestyle modifications to help control hyperlipidemia.     Patient Treatment Goals:   LDL goal is under 130    Followup in 3 months.

## 2024-06-04 ENCOUNTER — HOSPITAL ENCOUNTER (OUTPATIENT)
Dept: PET IMAGING | Facility: HOSPITAL | Age: 39
Discharge: HOME | End: 2024-06-04
Admitting: FAMILY MEDICINE
Payer: COMMERCIAL

## 2024-06-04 DIAGNOSIS — R91.1 LUNG NODULE: ICD-10-CM

## 2024-06-04 LAB — GLUCOSE BLDC GLUCOMTR-MCNC: 107 MG/DL (ref 70–105)

## 2024-06-04 PROCEDURE — 78815 PET IMAGE W/CT SKULL-THIGH: CPT

## 2024-06-04 PROCEDURE — 0 FLUDEOXYGLUCOSE F18 SOLUTION: Performed by: FAMILY MEDICINE

## 2024-06-04 PROCEDURE — 82948 REAGENT STRIP/BLOOD GLUCOSE: CPT

## 2024-06-04 PROCEDURE — A9552 F18 FDG: HCPCS | Performed by: FAMILY MEDICINE

## 2024-06-04 RX ADMIN — FLUDEOXYGLUCOSE F 18 1 DOSE: 200 INJECTION, SOLUTION INTRAVENOUS at 09:15

## 2024-08-15 ENCOUNTER — TELEPHONE (OUTPATIENT)
Dept: FAMILY MEDICINE CLINIC | Facility: CLINIC | Age: 39
End: 2024-08-15
Payer: COMMERCIAL

## 2024-08-15 NOTE — TELEPHONE ENCOUNTER
Caller: JAYY DAMON      Best call back number: [phone]     What was the call regarding: CALLER IS NEEDING TO SPEAK WITH CLINICAL REGARDING PATIENTS FMLA. THEY RECEIVED THE FAX SENT OVER TODAY BUT ONE QUESTION 4, THEY ARE NEEDING MEDICAL CONDITION/ DIAGNOSIS TO BE LISTED THAT PATIENT HAS     PLEASE ADVISE

## 2024-08-22 NOTE — PROGRESS NOTES
Subjective   Yoselin Gerard is a 39 y.o. female. Presents to [facility]    Chief Complaint   Patient presents with    Diabetes    Hypertension    Hyperlipidemia       Hypertension  This is a chronic problem. The current episode started more than 1 year ago. The problem has been stable since onset. The problem is controlled. Pertinent negatives include no chest pain, headaches, malaise/fatigue, shortness of breath or sweats. Risk factors for coronary artery disease include obesity. Current antihypertension treatment includes angiotensin blockers. The current treatment provides moderate improvement. Compliance problems include diet and exercise.    Diabetes  She presents for her follow-up diabetic visit. She has type 2 diabetes mellitus. Onset time: months. Pertinent negatives for hypoglycemia include no dizziness, headaches or sweats. Associated symptoms include fatigue. Pertinent negatives for diabetes include no chest pain. Pertinent negatives for hypoglycemia complications include no blackouts and no hospitalization. Risk factors for coronary artery disease include obesity. Current diabetic treatment includes oral agent (monotherapy). She is following a diabetic diet. Meal planning includes avoidance of concentrated sweets. She participates in exercise intermittently. An ACE inhibitor/angiotensin II receptor blocker is not being taken. She does not see a podiatrist. Eye exam is not current.        I personally reviewed and updated the patient's allergies, medications, problem list, and past medical, surgical, social, and family history. I have reviewed and confirmed the accuracy of the History of Present Illness and Review of Symptoms as documented by the MA/LPN/RN. Josefa Skelton MD    Allergies:  Allergies   Allergen Reactions    Penicillins Rash and Swelling    Hydrocodone Other (See Comments)     Numbness       Social History:  Social History     Socioeconomic History    Marital status:     Tobacco Use    Smoking status: Never    Smokeless tobacco: Never   Vaping Use    Vaping status: Never Used   Substance and Sexual Activity    Alcohol use: Not Currently    Drug use: Not Currently    Sexual activity: Defer       Family History:  Family History   Problem Relation Age of Onset    Heart attack Mother     Heart disease Mother     Heart disease Maternal Grandfather        Past Medical History :  Patient Active Problem List   Diagnosis    Anemia    Anxiety    Moderate episode of recurrent major depressive disorder    Gastroesophageal reflux disease, esophagitis presence not specified    Heartburn    Hypertension, benign    Moderate persistent asthma without complication    Plantar fasciitis    Palpitations    Osteoarthritis    Lumbosacral radiculopathy    Lumbar disc herniation with radiculopathy    Lower extremity edema    Chronic right-sided low back pain with right-sided sciatica    Excessive sweating    Encounter for general adult medical examination with abnormal findings    Hot flashes    Migraine with aura and without status migrainosus, not intractable    Class 3 severe obesity due to excess calories with serious comorbidity and body mass index (BMI) of 60.0 to 69.9 in adult    Skin lesion of neck    Mixed hyperlipidemia    Pap smear of cervix with ASCUS, cannot exclude HGSIL    Rh D negative blood type    Chlamydia vaginitis/cervicitis    Irregular menses    Type 2 diabetes mellitus with hyperglycemia, without long-term current use of insulin    Atypical chest pain    Lung nodule    Nevus       Medication List:    Current Outpatient Medications:     Blood Glucose Monitoring Suppl (FreeStyle Lite) device, Use 1 each Daily., Disp: 1 each, Rfl: 0    citalopram (CeleXA) 40 MG tablet, Take 1 tablet by mouth Daily., Disp: 90 tablet, Rfl: 3    clindamycin (CLEOCIN T) 1 % lotion, APPLY THIN LAYER TO AFFECTED ON BODY DAILY, AS NEEDED FOR FLARES., Disp: , Rfl:     clobetasol (TEMOVATE) 0.05 %  "external solution, Please see attached for detailed directions, Disp: , Rfl:     glucose blood test strip, 1 each by Other route Daily. Use as instructed, Disp: 100 each, Rfl: 3    glucose monitor monitoring kit, Use 1 each Daily., Disp: 1 each, Rfl: 0    ibuprofen (ADVIL,MOTRIN) 600 MG tablet, Take 1 tablet by mouth 3 times a day., Disp: , Rfl:     ketoconazole (NIZORAL) 2 % shampoo, Please see attached for detailed directions, Disp: , Rfl:     Lancets (freestyle) lancets, 1 each by Other route Daily., Disp: 100 each, Rfl: 3    losartan (COZAAR) 25 MG tablet, TAKE 1/2 TABLET BY MOUTH EVERY DAY, Disp: 30 tablet, Rfl: 6    metFORMIN (GLUCOPHAGE) 500 MG tablet, Take 1 tablet by mouth 2 (Two) Times a Day With Meals., Disp: 180 tablet, Rfl: 3    rosuvastatin (CRESTOR) 10 MG tablet, Take 1 tablet by mouth Every Night., Disp: 90 tablet, Rfl: 3    thiamine (VITAMIN B-1) 100 MG tablet  tablet, Take 1 tablet by mouth Daily., Disp: , Rfl:     topiramate (TOPAMAX) 25 MG tablet, Take 1 tablet by mouth Every 12 (Twelve) Hours., Disp: , Rfl:     vitamin B-12 (CYANOCOBALAMIN) 1000 MCG tablet, Take 1 tablet by mouth Daily., Disp: , Rfl:     Past Surgical History:  Past Surgical History:   Procedure Laterality Date    CARPAL TUNNEL RELEASE       SECTION      3    TUBAL ABDOMINAL LIGATION           Physical Exam:      Vital Signs:    Vitals:    24 1429   BP: 122/80   Pulse: 107   Resp: 19   Temp: 97.7 °F (36.5 °C)   SpO2: 98%        /80 (BP Location: Right arm, Patient Position: Sitting, Cuff Size: Large Adult)   Pulse 107   Temp 97.7 °F (36.5 °C) (Temporal)   Resp 19   Ht 157.5 cm (62.01\")   Wt (!) 163 kg (360 lb)   LMP 2024   SpO2 98% Comment: ra  BMI 65.82 kg/m²     Wt Readings from Last 3 Encounters:   24 (!) 163 kg (360 lb)   24 (!) 163 kg (359 lb 6.4 oz)   24 (!) 164 kg (361 lb 12.8 oz)       Result Review :   The following data was reviewed by: Josefa Skelton MD on " 08/27/2024:  A1C Last 3 Results          1/8/2024    12:03 4/30/2024    16:52 8/27/2024    14:39   HGBA1C Last 3 Results   Hemoglobin A1C 6.8  6.2  5.8                 Physical Exam  Vitals reviewed.   Constitutional:       Appearance: Normal appearance. She is well-developed.   HENT:      Head: Normocephalic and atraumatic.   Eyes:      General:         Right eye: No discharge.         Left eye: No discharge.   Cardiovascular:      Rate and Rhythm: Normal rate and regular rhythm.      Heart sounds: Normal heart sounds. No murmur heard.     No friction rub. No gallop.   Pulmonary:      Effort: Pulmonary effort is normal. No respiratory distress.      Breath sounds: Normal breath sounds. No wheezing or rales.   Skin:     General: Skin is warm and dry.      Findings: No rash.   Neurological:      Mental Status: She is alert and oriented to person, place, and time.      Coordination: Coordination normal.      Gait: Gait normal.   Psychiatric:         Behavior: Behavior is cooperative.         Assessment and Plan:  Problems Addressed this Visit          Cardiac and Vasculature    Hypertension, benign     Hypertension is stable and controlled  Continue current treatment regimen.  Dietary sodium restriction.  Blood pressure will be reassessed in 3 months.         Relevant Orders    Comprehensive Metabolic Panel    Mixed hyperlipidemia      She is on crestor  Continue current treatment  Will get labs         Relevant Orders    Lipid Panel With / Chol / HDL Ratio       Endocrine and Metabolic    Class 3 severe obesity due to excess calories with serious comorbidity and body mass index (BMI) of 60.0 to 69.9 in adult    Type 2 diabetes mellitus with hyperglycemia, without long-term current use of insulin - Primary     Diabetes is improving with lifestyle modifications. And metformin  Continue current treatment regimen.  Recommended a Mediterranean style of eating  Diabetes will be reassessed in 3 months         Relevant  Orders    POC Glycosylated Hemoglobin (Hb A1C) (Completed)     Diagnoses         Codes Comments    Type 2 diabetes mellitus with hyperglycemia, without long-term current use of insulin    -  Primary ICD-10-CM: E11.65  ICD-9-CM: 250.00, 790.29     Hypertension, benign     ICD-10-CM: I10  ICD-9-CM: 401.1     Mixed hyperlipidemia     ICD-10-CM: E78.2  ICD-9-CM: 272.2     Class 3 severe obesity due to excess calories with serious comorbidity and body mass index (BMI) of 60.0 to 69.9 in adult     ICD-10-CM: E66.01, Z68.44  ICD-9-CM: 278.01, V85.44                          An After Visit Summary and PPPS were given to the patient.

## 2024-08-27 ENCOUNTER — OFFICE VISIT (OUTPATIENT)
Dept: FAMILY MEDICINE CLINIC | Facility: CLINIC | Age: 39
End: 2024-08-27
Payer: COMMERCIAL

## 2024-08-27 VITALS
SYSTOLIC BLOOD PRESSURE: 122 MMHG | DIASTOLIC BLOOD PRESSURE: 80 MMHG | OXYGEN SATURATION: 98 % | TEMPERATURE: 97.7 F | WEIGHT: 293 LBS | RESPIRATION RATE: 19 BRPM | BODY MASS INDEX: 53.92 KG/M2 | HEIGHT: 62 IN | HEART RATE: 107 BPM

## 2024-08-27 DIAGNOSIS — E66.01 CLASS 3 SEVERE OBESITY DUE TO EXCESS CALORIES WITH SERIOUS COMORBIDITY AND BODY MASS INDEX (BMI) OF 60.0 TO 69.9 IN ADULT: ICD-10-CM

## 2024-08-27 DIAGNOSIS — I10 HYPERTENSION, BENIGN: ICD-10-CM

## 2024-08-27 DIAGNOSIS — E11.65 TYPE 2 DIABETES MELLITUS WITH HYPERGLYCEMIA, WITHOUT LONG-TERM CURRENT USE OF INSULIN: Primary | ICD-10-CM

## 2024-08-27 DIAGNOSIS — E78.2 MIXED HYPERLIPIDEMIA: ICD-10-CM

## 2024-08-27 LAB
EXPIRATION DATE: ABNORMAL
HBA1C MFR BLD: 5.8 % (ref 4.5–5.7)
Lab: ABNORMAL

## 2024-08-27 PROCEDURE — 99214 OFFICE O/P EST MOD 30 MIN: CPT | Performed by: FAMILY MEDICINE

## 2024-08-27 PROCEDURE — 83036 HEMOGLOBIN GLYCOSYLATED A1C: CPT | Performed by: FAMILY MEDICINE

## 2024-08-27 RX ORDER — TOPIRAMATE 25 MG/1
1 TABLET, FILM COATED ORAL EVERY 12 HOURS SCHEDULED
COMMUNITY
Start: 2024-06-26

## 2024-08-27 RX ORDER — CLINDAMYCIN PHOSPHATE 10 UG/ML
LOTION TOPICAL
COMMUNITY
Start: 2024-07-01

## 2024-08-27 RX ORDER — CLOBETASOL PROPIONATE 0.5 MG/ML
SOLUTION TOPICAL
COMMUNITY
Start: 2024-07-01

## 2024-08-27 RX ORDER — KETOCONAZOLE 20 MG/ML
SHAMPOO TOPICAL
COMMUNITY
Start: 2024-07-01

## 2024-08-27 NOTE — ASSESSMENT & PLAN NOTE
Diabetes is improving with lifestyle modifications. And metformin  Continue current treatment regimen.  Recommended a Mediterranean style of eating  Diabetes will be reassessed in 3 months

## 2024-09-24 ENCOUNTER — OFFICE VISIT (OUTPATIENT)
Dept: FAMILY MEDICINE CLINIC | Facility: CLINIC | Age: 39
End: 2024-09-24
Payer: COMMERCIAL

## 2024-09-24 VITALS
RESPIRATION RATE: 18 BRPM | BODY MASS INDEX: 53.92 KG/M2 | OXYGEN SATURATION: 96 % | HEART RATE: 82 BPM | HEIGHT: 62 IN | DIASTOLIC BLOOD PRESSURE: 80 MMHG | WEIGHT: 293 LBS | SYSTOLIC BLOOD PRESSURE: 122 MMHG | TEMPERATURE: 97.3 F

## 2024-09-24 DIAGNOSIS — E78.2 MIXED HYPERLIPIDEMIA: ICD-10-CM

## 2024-09-24 DIAGNOSIS — R21 RASH: Primary | ICD-10-CM

## 2024-09-24 DIAGNOSIS — E66.01 CLASS 3 SEVERE OBESITY DUE TO EXCESS CALORIES WITH SERIOUS COMORBIDITY AND BODY MASS INDEX (BMI) OF 60.0 TO 69.9 IN ADULT: ICD-10-CM

## 2024-09-24 PROCEDURE — 99213 OFFICE O/P EST LOW 20 MIN: CPT | Performed by: FAMILY MEDICINE

## 2024-09-24 RX ORDER — SULFAMETHOXAZOLE/TRIMETHOPRIM 800-160 MG
1 TABLET ORAL 2 TIMES DAILY
Qty: 20 TABLET | Refills: 0 | Status: SHIPPED | OUTPATIENT
Start: 2024-09-24

## 2024-09-24 RX ORDER — VALACYCLOVIR HYDROCHLORIDE 1 G/1
1000 TABLET, FILM COATED ORAL EVERY 12 HOURS
Qty: 20 TABLET | Refills: 0 | Status: SHIPPED | OUTPATIENT
Start: 2024-09-24

## 2024-09-24 RX ORDER — MUPIROCIN CALCIUM 20 MG/G
1 CREAM TOPICAL 3 TIMES DAILY
Qty: 1 EACH | Refills: 0 | Status: SHIPPED | OUTPATIENT
Start: 2024-09-24

## 2024-09-25 LAB
ALBUMIN SERPL-MCNC: 3.8 G/DL (ref 3.9–4.9)
ALP SERPL-CCNC: 68 IU/L (ref 44–121)
ALT SERPL-CCNC: 16 IU/L (ref 0–32)
AST SERPL-CCNC: 18 IU/L (ref 0–40)
BILIRUB SERPL-MCNC: 0.4 MG/DL (ref 0–1.2)
BUN SERPL-MCNC: 9 MG/DL (ref 6–20)
BUN/CREAT SERPL: 14 (ref 9–23)
CALCIUM SERPL-MCNC: 8.9 MG/DL (ref 8.7–10.2)
CHLORIDE SERPL-SCNC: 102 MMOL/L (ref 96–106)
CHOLEST SERPL-MCNC: 191 MG/DL (ref 100–199)
CHOLEST/HDLC SERPL: 3.7 RATIO (ref 0–4.4)
CO2 SERPL-SCNC: 22 MMOL/L (ref 20–29)
CREAT SERPL-MCNC: 0.66 MG/DL (ref 0.57–1)
EGFRCR SERPLBLD CKD-EPI 2021: 115 ML/MIN/1.73
GLOBULIN SER CALC-MCNC: 2.6 G/DL (ref 1.5–4.5)
GLUCOSE SERPL-MCNC: 118 MG/DL (ref 70–99)
HDLC SERPL-MCNC: 52 MG/DL
LDLC SERPL CALC-MCNC: 121 MG/DL (ref 0–99)
POTASSIUM SERPL-SCNC: 4.2 MMOL/L (ref 3.5–5.2)
PROT SERPL-MCNC: 6.4 G/DL (ref 6–8.5)
SODIUM SERPL-SCNC: 141 MMOL/L (ref 134–144)
TRIGL SERPL-MCNC: 98 MG/DL (ref 0–149)
VLDLC SERPL CALC-MCNC: 18 MG/DL (ref 5–40)

## 2024-12-16 NOTE — PROGRESS NOTES
Subjective   Yoselin Gerard is a 39 y.o. female. Presents to [facility] [place]    Chief Complaint   Patient presents with    Diabetes    Hypertension    Hyperlipidemia       Hypertension  This is a chronic problem. The current episode started more than 1 year ago. The problem has been stable since onset. The problem is controlled. Associated symptoms include headaches. Pertinent negatives include no chest pain, malaise/fatigue, palpitations, shortness of breath or sweats. Risk factors for coronary artery disease include obesity. Current antihypertension treatment includes angiotensin blockers. The current treatment provides moderate improvement. Compliance problems include diet and exercise.    Diabetes  She presents for her follow-up diabetic visit. She has type 2 diabetes mellitus. Onset time: months. Hypoglycemia symptoms include headaches. Pertinent negatives for hypoglycemia include no dizziness or sweats. Pertinent negatives for diabetes include no chest pain, no fatigue and no weakness. Pertinent negatives for hypoglycemia complications include no blackouts and no hospitalization. Risk factors for coronary artery disease include obesity. Current diabetic treatment includes oral agent (monotherapy). She is following a diabetic diet. Meal planning includes avoidance of concentrated sweets. She participates in exercise intermittently. Her overall blood glucose range is 110-130 mg/dl. An ACE inhibitor/angiotensin II receptor blocker is not being taken. She does not see a podiatrist. Eye exam is not current.   Hyperlipidemia  This is a chronic problem. The current episode started more than 1 year ago. Exacerbating diseases include diabetes and obesity. Pertinent negatives include no chest pain or shortness of breath. Current antihyperlipidemic treatment includes statins. The current treatment provides moderate improvement of lipids. Risk factors for coronary artery disease include dyslipidemia, diabetes  mellitus, hypertension and obesity.      She has not been taking any of her medications    I personally reviewed and updated the patient's allergies, medications, problem list, and past medical, surgical, social, and family history. I have reviewed and confirmed the accuracy of the History of Present Illness and Review of Symptoms as documented by the MA/LPN/RN. Josefa Skelton MD    Allergies:  Allergies   Allergen Reactions    Penicillins Rash and Swelling    Hydrocodone Other (See Comments)     Numbness       Social History:  Social History     Socioeconomic History    Marital status:    Tobacco Use    Smoking status: Never    Smokeless tobacco: Never   Vaping Use    Vaping status: Never Used   Substance and Sexual Activity    Alcohol use: Not Currently    Drug use: Not Currently    Sexual activity: Defer       Family History:  Family History   Problem Relation Age of Onset    Heart attack Mother     Heart disease Mother     Heart disease Maternal Grandfather        Past Medical History :  Patient Active Problem List   Diagnosis    Anemia    Anxiety    Moderate episode of recurrent major depressive disorder    Gastroesophageal reflux disease, esophagitis presence not specified    Heartburn    Hypertension, benign    Moderate persistent asthma without complication    Plantar fasciitis    Palpitations    Osteoarthritis    Lumbosacral radiculopathy    Lumbar disc herniation with radiculopathy    Lower extremity edema    Chronic right-sided low back pain with right-sided sciatica    Excessive sweating    Encounter for general adult medical examination with abnormal findings    Hot flashes    Migraine with aura and without status migrainosus, not intractable    Class 3 severe obesity due to excess calories with serious comorbidity and body mass index (BMI) of 60.0 to 69.9 in adult    Skin lesion of neck    Mixed hyperlipidemia    Pap smear of cervix with ASCUS, cannot exclude HGSIL    Rh D negative blood type     Chlamydia vaginitis/cervicitis    Irregular menses    Type 2 diabetes mellitus with hyperglycemia, without long-term current use of insulin    Atypical chest pain    Lung nodule    Nevus    Rash       Medication List:    Current Outpatient Medications:     Blood Glucose Monitoring Suppl (FreeStyle Lite) device, Use 1 each Daily., Disp: 1 each, Rfl: 0    citalopram (CeleXA) 20 MG tablet, Take 1 tablet by mouth Daily., Disp: 90 tablet, Rfl: 3    glucose blood test strip, 1 each by Other route Daily. Use as instructed, Disp: 100 each, Rfl: 3    glucose monitor monitoring kit, Use 1 each Daily., Disp: 1 each, Rfl: 0    ibuprofen (ADVIL,MOTRIN) 600 MG tablet, Take 1 tablet by mouth 3 times a day., Disp: , Rfl:     ketoconazole (NIZORAL) 2 % shampoo, Please see attached for detailed directions, Disp: , Rfl:     Lancets (freestyle) lancets, 1 each by Other route Daily., Disp: 100 each, Rfl: 3    losartan (COZAAR) 25 MG tablet, Take 0.5 tablets by mouth Daily., Disp: 45 tablet, Rfl: 3    metFORMIN (GLUCOPHAGE) 500 MG tablet, Take 1 tablet by mouth 2 (Two) Times a Day With Meals., Disp: 180 tablet, Rfl: 3    mupirocin (BACTROBAN) 2 % cream, Apply 1 Application topically to the appropriate area as directed 3 (Three) Times a Day., Disp: 1 each, Rfl: 0    rosuvastatin (CRESTOR) 10 MG tablet, Take 1 tablet by mouth Every Night., Disp: 90 tablet, Rfl: 3    valACYclovir (VALTREX) 1000 MG tablet, Take 1 tablet by mouth Every 12 (Twelve) Hours., Disp: 20 tablet, Rfl: 0    vitamin B-12 (CYANOCOBALAMIN) 1000 MCG tablet, Take 1 tablet by mouth Daily., Disp: , Rfl:     clobetasol (TEMOVATE) 0.05 % external solution, Please see attached for detailed directions (Patient not taking: Reported on 2024), Disp: , Rfl:     topiramate (TOPAMAX) 25 MG tablet, Take 1 tablet by mouth Every 12 (Twelve) Hours., Disp: , Rfl:     Past Surgical History:  Past Surgical History:   Procedure Laterality Date    CARPAL TUNNEL RELEASE        "SECTION      3    TUBAL ABDOMINAL LIGATION           Physical Exam:      Vital Signs:    Vitals:    12/17/24 1349   BP: 122/76   Pulse: 108   Resp: 19   Temp: 97.3 °F (36.3 °C)   SpO2: 99%        /76 (BP Location: Right arm, Patient Position: Sitting, Cuff Size: Large Adult)   Pulse 108   Temp 97.3 °F (36.3 °C) (Temporal)   Resp 19   Ht 157.5 cm (62.01\")   Wt (!) 160 kg (352 lb 12.8 oz)   LMP 12/03/2024   SpO2 99% Comment: ra  BMI 64.51 kg/m²     Wt Readings from Last 3 Encounters:   12/17/24 (!) 160 kg (352 lb 12.8 oz)   09/24/24 (!) 164 kg (361 lb 3.2 oz)   08/27/24 (!) 163 kg (360 lb)       Result Review :   The following data was reviewed by: Josefa Skelton MD on 12/17/2024:  A1C Last 3 Results          4/30/2024    16:52 8/27/2024    14:39 12/17/2024    14:08   HGBA1C Last 3 Results   Hemoglobin A1C 6.2  5.8  6.5               Physical Exam  Vitals reviewed.   Constitutional:       Appearance: Normal appearance. She is well-developed.   HENT:      Head: Normocephalic and atraumatic.   Eyes:      General:         Right eye: No discharge.         Left eye: No discharge.   Cardiovascular:      Rate and Rhythm: Normal rate and regular rhythm.      Heart sounds: Normal heart sounds. No murmur heard.     No friction rub. No gallop.   Pulmonary:      Effort: Pulmonary effort is normal. No respiratory distress.      Breath sounds: Normal breath sounds. No wheezing or rales.   Skin:     General: Skin is warm and dry.      Findings: No rash.   Neurological:      Mental Status: She is alert and oriented to person, place, and time.      Coordination: Coordination normal.      Gait: Gait normal.   Psychiatric:         Behavior: Behavior is cooperative.         Assessment and Plan:  Problems Addressed this Visit          Cardiac and Vasculature    Hypertension, benign     Hypertension is stable and controlled  Continue current treatment regimen.  Dietary sodium restriction.  Blood pressure will be reassessed " in 3 months.         Relevant Medications    losartan (COZAAR) 25 MG tablet    Mixed hyperlipidemia      She stopped her medication  Restart crestor  Will get labs         Relevant Medications    rosuvastatin (CRESTOR) 10 MG tablet       Endocrine and Metabolic    Class 3 severe obesity due to excess calories with serious comorbidity and body mass index (BMI) of 60.0 to 69.9 in adult     Patient's (Body mass index is 64.51 kg/m².) indicates that they are morbidly/severely obese (BMI > 40 or > 35 with obesity - related health condition) with health conditions that include hypertension and diabetes mellitus . Weight is improving with lifestyle modifications. BMI  is above average; BMI management plan is completed. We discussed low calorie, low carb based diet program, portion control, and increasing exercise.          Type 2 diabetes mellitus with hyperglycemia, without long-term current use of insulin - Primary     Diabetes is worsening.   Continue current treatment regimen.  Recommended a Mediterranean style of eating  Regular aerobic exercise.  Diabetes will be reassessed in 3 months         Relevant Medications    metFORMIN (GLUCOPHAGE) 500 MG tablet    Other Relevant Orders    POC Glycosylated Hemoglobin (Hb A1C) (Completed)       Mental Health    Anxiety     Restart celexa    Diagnosis, treatment and and course discussed. Potential side effects discussed. Return if there is worsening or persistence of symptoms.            Relevant Medications    citalopram (CeleXA) 20 MG tablet     Other Visit Diagnoses       Borderline diabetes        Relevant Medications    metFORMIN (GLUCOPHAGE) 500 MG tablet          Diagnoses         Codes Comments    Type 2 diabetes mellitus with hyperglycemia, without long-term current use of insulin    -  Primary ICD-10-CM: E11.65  ICD-9-CM: 250.00, 790.29     Hypertension, benign     ICD-10-CM: I10  ICD-9-CM: 401.1     Mixed hyperlipidemia     ICD-10-CM: E78.2  ICD-9-CM: 272.2     Class  3 severe obesity due to excess calories with serious comorbidity and body mass index (BMI) of 60.0 to 69.9 in adult     ICD-10-CM: E66.813, E66.01, Z68.44  ICD-9-CM: 278.01, V85.44     Anxiety     ICD-10-CM: F41.9  ICD-9-CM: 300.00     Borderline diabetes     ICD-10-CM: R73.03  ICD-9-CM: 790.29              Class 3 Severe Obesity (BMI >=40). Obesity-related health conditions include the following: hypertension. Obesity is improving with lifestyle modifications. BMI is is above average; BMI management plan is completed. We discussed low calorie, low carb based diet program, portion control, and increasing exercise.           An After Visit Summary and PPPS were given to the patient.       This document is intended for medical expert use only. Reading of this document by patients and/or patient's family without participating medical staff guidance may result in misinterpretation and unintended morbidity. Any interpretation of such data is the responsibility of the patient and/or family member responsible for the patient in concert with their primary or specialist providers, not to be left for sources of online searches such as [employer], [employer] or similar queries. Relying on these approaches to knowledge may result in misinterpretation, misguided goals of care and even death should patients or family members try recommendations outside of the realm of professional medical care.

## 2024-12-17 ENCOUNTER — OFFICE VISIT (OUTPATIENT)
Dept: FAMILY MEDICINE CLINIC | Facility: CLINIC | Age: 39
End: 2024-12-17
Payer: MEDICAID

## 2024-12-17 VITALS
WEIGHT: 293 LBS | BODY MASS INDEX: 53.92 KG/M2 | RESPIRATION RATE: 19 BRPM | HEART RATE: 108 BPM | SYSTOLIC BLOOD PRESSURE: 122 MMHG | DIASTOLIC BLOOD PRESSURE: 76 MMHG | TEMPERATURE: 97.3 F | OXYGEN SATURATION: 99 % | HEIGHT: 62 IN

## 2024-12-17 DIAGNOSIS — R73.03 BORDERLINE DIABETES: ICD-10-CM

## 2024-12-17 DIAGNOSIS — E66.01 CLASS 3 SEVERE OBESITY DUE TO EXCESS CALORIES WITH SERIOUS COMORBIDITY AND BODY MASS INDEX (BMI) OF 60.0 TO 69.9 IN ADULT: ICD-10-CM

## 2024-12-17 DIAGNOSIS — E78.2 MIXED HYPERLIPIDEMIA: ICD-10-CM

## 2024-12-17 DIAGNOSIS — E66.813 CLASS 3 SEVERE OBESITY DUE TO EXCESS CALORIES WITH SERIOUS COMORBIDITY AND BODY MASS INDEX (BMI) OF 60.0 TO 69.9 IN ADULT: ICD-10-CM

## 2024-12-17 DIAGNOSIS — I10 HYPERTENSION, BENIGN: ICD-10-CM

## 2024-12-17 DIAGNOSIS — F41.9 ANXIETY: ICD-10-CM

## 2024-12-17 DIAGNOSIS — E11.65 TYPE 2 DIABETES MELLITUS WITH HYPERGLYCEMIA, WITHOUT LONG-TERM CURRENT USE OF INSULIN: Primary | ICD-10-CM

## 2024-12-17 LAB
EXPIRATION DATE: ABNORMAL
HBA1C MFR BLD: 6.5 % (ref 4.5–5.7)
Lab: ABNORMAL

## 2024-12-17 PROCEDURE — 83036 HEMOGLOBIN GLYCOSYLATED A1C: CPT | Performed by: FAMILY MEDICINE

## 2024-12-17 PROCEDURE — 1126F AMNT PAIN NOTED NONE PRSNT: CPT | Performed by: FAMILY MEDICINE

## 2024-12-17 PROCEDURE — 3078F DIAST BP <80 MM HG: CPT | Performed by: FAMILY MEDICINE

## 2024-12-17 PROCEDURE — 99214 OFFICE O/P EST MOD 30 MIN: CPT | Performed by: FAMILY MEDICINE

## 2024-12-17 PROCEDURE — 3044F HG A1C LEVEL LT 7.0%: CPT | Performed by: FAMILY MEDICINE

## 2024-12-17 PROCEDURE — 3074F SYST BP LT 130 MM HG: CPT | Performed by: FAMILY MEDICINE

## 2024-12-17 RX ORDER — CITALOPRAM HYDROBROMIDE 20 MG/1
20 TABLET ORAL DAILY
Qty: 90 TABLET | Refills: 3 | Status: SHIPPED | OUTPATIENT
Start: 2024-12-17

## 2024-12-17 RX ORDER — LOSARTAN POTASSIUM 25 MG/1
12.5 TABLET ORAL DAILY
Qty: 45 TABLET | Refills: 3 | Status: SHIPPED | OUTPATIENT
Start: 2024-12-17

## 2024-12-17 RX ORDER — ROSUVASTATIN CALCIUM 10 MG/1
10 TABLET, COATED ORAL NIGHTLY
Qty: 90 TABLET | Refills: 3 | Status: SHIPPED | OUTPATIENT
Start: 2024-12-17

## 2024-12-31 NOTE — ASSESSMENT & PLAN NOTE
Restart celexa    Diagnosis, treatment and and course discussed. Potential side effects discussed. Return if there is worsening or persistence of symptoms.

## 2024-12-31 NOTE — ASSESSMENT & PLAN NOTE
Patient's (Body mass index is 64.51 kg/m².) indicates that they are morbidly/severely obese (BMI > 40 or > 35 with obesity - related health condition) with health conditions that include hypertension and diabetes mellitus . Weight is improving with lifestyle modifications. BMI  is above average; BMI management plan is completed. We discussed low calorie, low carb based diet program, portion control, and increasing exercise.

## 2024-12-31 NOTE — ASSESSMENT & PLAN NOTE
Diabetes is worsening.   Continue current treatment regimen.  Recommended a Mediterranean style of eating  Regular aerobic exercise.  Diabetes will be reassessed in 3 months

## 2025-03-25 NOTE — PROGRESS NOTES
Subjective   Yoselin Gerard is a 40 y.o. female. Presents to [facility]    Chief Complaint   Patient presents with    Diabetes    Hypertension    Hyperlipidemia       Hypertension  This is a chronic problem. The current episode started more than 1 year ago. The problem has been stable since onset. The problem is controlled. Pertinent negatives include no chest pain, headaches, malaise/fatigue, palpitations, shortness of breath or sweats. Risk factors for coronary artery disease include obesity. Current antihypertension treatment includes angiotensin blockers. The current treatment provides moderate improvement. Compliance problems include diet and exercise.    Diabetes  She presents for her follow-up diabetic visit. She has type 2 diabetes mellitus. Pertinent negatives for hypoglycemia include no dizziness, headaches or sweats. Pertinent negatives for diabetes include no chest pain, no fatigue and no weakness. Pertinent negatives for hypoglycemia complications include no blackouts and no hospitalization. Risk factors for coronary artery disease include obesity. Current diabetic treatment includes oral agent (monotherapy). She is following a diabetic diet. Meal planning includes avoidance of concentrated sweets. She participates in exercise intermittently. Her overall blood glucose range is 130-140 mg/dl. An ACE inhibitor/angiotensin II receptor blocker is not being taken. She does not see a podiatrist. Eye exam is not current.   Hyperlipidemia  This is a chronic problem. The current episode started more than 1 year ago. Exacerbating diseases include diabetes and obesity. Pertinent negatives include no chest pain or shortness of breath. Current antihyperlipidemic treatment includes statins. The current treatment provides moderate improvement of lipids. Risk factors for coronary artery disease include dyslipidemia, diabetes mellitus, hypertension and obesity.        I personally reviewed and updated  the patient's allergies, medications, problem list, and past medical, surgical, social, and family history. I have reviewed and confirmed the accuracy of the History of Present Illness and Review of Symptoms as documented by the MA/LPN/RN. Josefa Skelton MD    Allergies:  Allergies   Allergen Reactions    Penicillins Rash and Swelling    Hydrocodone Other (See Comments)     Numbness       Social History:  Social History     Socioeconomic History    Marital status:    Tobacco Use    Smoking status: Never    Smokeless tobacco: Never   Vaping Use    Vaping status: Never Used   Substance and Sexual Activity    Alcohol use: Not Currently    Drug use: Not Currently    Sexual activity: Defer       Family History:  Family History   Problem Relation Age of Onset    Heart attack Mother     Heart disease Mother     Heart disease Maternal Grandfather        Past Medical History :  Patient Active Problem List   Diagnosis    Anemia    Anxiety    Moderate episode of recurrent major depressive disorder    Gastroesophageal reflux disease, esophagitis presence not specified    Heartburn    Hypertension, benign    Moderate persistent asthma without complication    Plantar fasciitis    Palpitations    Osteoarthritis    Lumbosacral radiculopathy    Lumbar disc herniation with radiculopathy    Lower extremity edema    Chronic right-sided low back pain with right-sided sciatica    Excessive sweating    Encounter for general adult medical examination with abnormal findings    Hot flashes    Migraine with aura and without status migrainosus, not intractable    Class 3 severe obesity due to excess calories with serious comorbidity and body mass index (BMI) of 60.0 to 69.9 in adult    Skin lesion of neck    Mixed hyperlipidemia    Pap smear of cervix with ASCUS, cannot exclude HGSIL    Rh D negative blood type    Chlamydia vaginitis/cervicitis    Irregular menses    Type 2 diabetes mellitus with hyperglycemia, without long-term  current use of insulin    Atypical chest pain    Lung nodule    Nevus    Rash       Medication List:    Current Outpatient Medications:     Blood Glucose Monitoring Suppl (FreeStyle Lite) device, Use 1 each Daily., Disp: 1 each, Rfl: 0    citalopram (CeleXA) 20 MG tablet, Take 1 tablet by mouth Daily., Disp: 90 tablet, Rfl: 3    clobetasol (TEMOVATE) 0.05 % external solution, Please see attached for detailed directions, Disp: , Rfl:     glucose blood test strip, 1 each by Other route Daily. Use as instructed, Disp: 100 each, Rfl: 3    glucose monitor monitoring kit, Use 1 each Daily., Disp: 1 each, Rfl: 0    ibuprofen (ADVIL,MOTRIN) 600 MG tablet, Take 1 tablet by mouth 3 times a day., Disp: , Rfl:     ketoconazole (NIZORAL) 2 % shampoo, Please see attached for detailed directions, Disp: , Rfl:     Lancets (freestyle) lancets, 1 each by Other route Daily., Disp: 100 each, Rfl: 3    losartan (COZAAR) 25 MG tablet, Take 0.5 tablets by mouth Daily., Disp: 45 tablet, Rfl: 3    metFORMIN (GLUCOPHAGE) 500 MG tablet, Take 1 tablet by mouth 2 (Two) Times a Day With Meals., Disp: 180 tablet, Rfl: 3    mupirocin (BACTROBAN) 2 % cream, Apply 1 Application topically to the appropriate area as directed 3 (Three) Times a Day., Disp: 1 each, Rfl: 0    rosuvastatin (CRESTOR) 10 MG tablet, Take 1 tablet by mouth Every Night., Disp: 90 tablet, Rfl: 3    topiramate (TOPAMAX) 25 MG tablet, Take 1 tablet by mouth Every 12 (Twelve) Hours., Disp: , Rfl:     valACYclovir (VALTREX) 1000 MG tablet, Take 1 tablet by mouth Every 12 (Twelve) Hours., Disp: 20 tablet, Rfl: 0    vitamin B-12 (CYANOCOBALAMIN) 1000 MCG tablet, Take 1 tablet by mouth Daily., Disp: , Rfl:     Past Surgical History:  Past Surgical History:   Procedure Laterality Date    CARPAL TUNNEL RELEASE       SECTION      3    TUBAL ABDOMINAL LIGATION           Physical Exam:      Vital Signs:    Vitals:    25 1700   BP: 132/88   Pulse: 103   Resp: 19   Temp: 97.7 °F  "(36.5 °C)   SpO2: 96%        /88 (BP Location: Right arm, Patient Position: Sitting, Cuff Size: Large Adult)   Pulse 103   Temp 97.7 °F (36.5 °C) (Temporal)   Resp 19   Ht 157.5 cm (62.01\")   Wt (!) 167 kg (368 lb 3.2 oz)   LMP 03/26/2025   SpO2 96% Comment: ra  BMI 67.32 kg/m²     Wt Readings from Last 3 Encounters:   03/28/25 (!) 167 kg (368 lb 3.2 oz)   12/17/24 (!) 160 kg (352 lb 12.8 oz)   09/24/24 (!) 164 kg (361 lb 3.2 oz)       Result Review :   The following data was reviewed by: Josefa Skelton MD on 03/28/2025:  A1C Last 3 Results          8/27/2024    14:39 12/17/2024    14:08 3/28/2025    17:06   HGBA1C Last 3 Results   Hemoglobin A1C 5.8  6.5  6.1               Physical Exam  Vitals reviewed.   Constitutional:       Appearance: Normal appearance. She is well-developed.   HENT:      Head: Normocephalic and atraumatic.   Eyes:      General:         Right eye: No discharge.         Left eye: No discharge.   Cardiovascular:      Rate and Rhythm: Normal rate and regular rhythm.      Heart sounds: Normal heart sounds. No murmur heard.     No friction rub. No gallop.   Pulmonary:      Effort: Pulmonary effort is normal. No respiratory distress.      Breath sounds: Normal breath sounds. No wheezing or rales.   Skin:     General: Skin is warm and dry.      Findings: No rash.   Neurological:      Mental Status: She is alert and oriented to person, place, and time.      Coordination: Coordination normal.      Gait: Gait normal.   Psychiatric:         Behavior: Behavior is cooperative.         Assessment and Plan:  Problems Addressed this Visit          Cardiac and Vasculature    Hypertension, benign    Hypertension is stable and controlled  Continue current treatment regimen.  Dietary sodium restriction.  Blood pressure will be reassessed in 3 months         Relevant Orders    Comprehensive Metabolic Panel    Mixed hyperlipidemia     She is on crestor  Will check labs  Continue current treatment   "       Relevant Orders    Lipid Panel With / Chol / HDL Ratio       Endocrine and Metabolic    Class 3 severe obesity due to excess calories with serious comorbidity and body mass index (BMI) of 60.0 to 69.9 in adult    Patient's (Body mass index is 67.32 kg/m².) indicates that they are morbidly/severely obese (BMI > 40 or > 35 with obesity - related health condition) with health conditions that include diabetes mellitus . Weight is worsening. BMI  is above average; BMI management plan is completed. We discussed low calorie, low carb based diet program, portion control, and increasing exercise.          Type 2 diabetes mellitus with hyperglycemia, without long-term current use of insulin - Primary    Diabetes is improving with treatment.   Continue current treatment regimen.  Recommended a Mediterranean style of eating  Diabetes will be reassessed in 3 months         Relevant Orders    POC Glycosylated Hemoglobin (Hb A1C) (Completed)     Diagnoses         Codes Comments      Type 2 diabetes mellitus with hyperglycemia, without long-term current use of insulin    -  Primary ICD-10-CM: E11.65  ICD-9-CM: 250.00, 790.29       Hypertension, benign     ICD-10-CM: I10  ICD-9-CM: 401.1       Mixed hyperlipidemia     ICD-10-CM: E78.2  ICD-9-CM: 272.2       Class 3 severe obesity due to excess calories with serious comorbidity and body mass index (BMI) of 60.0 to 69.9 in adult     ICD-10-CM: E66.813, E66.01, Z68.44  ICD-9-CM: 278.01, V85.44                          An After Visit Summary and PPPS were given to the patient.       This document is intended for medical expert use only. Reading of this document by patients and/or patient's family without participating medical staff guidance may result in misinterpretation and unintended morbidity. Any interpretation of such data is the responsibility of the patient and/or family member responsible for the patient in concert with their primary or specialist providers, not to be  left for sources of online searches such as [employer], [employer] or similar queries. Relying on these approaches to knowledge may result in misinterpretation, misguided goals of care and even death should patients or family members try recommendations outside of the realm of professional medical care.

## 2025-03-28 ENCOUNTER — OFFICE VISIT (OUTPATIENT)
Dept: FAMILY MEDICINE CLINIC | Facility: CLINIC | Age: 40
End: 2025-03-28
Payer: MEDICAID

## 2025-03-28 VITALS
BODY MASS INDEX: 53.92 KG/M2 | TEMPERATURE: 97.7 F | RESPIRATION RATE: 19 BRPM | OXYGEN SATURATION: 96 % | SYSTOLIC BLOOD PRESSURE: 132 MMHG | HEART RATE: 103 BPM | DIASTOLIC BLOOD PRESSURE: 88 MMHG | HEIGHT: 62 IN | WEIGHT: 293 LBS

## 2025-03-28 DIAGNOSIS — E11.65 TYPE 2 DIABETES MELLITUS WITH HYPERGLYCEMIA, WITHOUT LONG-TERM CURRENT USE OF INSULIN: Primary | ICD-10-CM

## 2025-03-28 DIAGNOSIS — E66.813 CLASS 3 SEVERE OBESITY DUE TO EXCESS CALORIES WITH SERIOUS COMORBIDITY AND BODY MASS INDEX (BMI) OF 60.0 TO 69.9 IN ADULT: ICD-10-CM

## 2025-03-28 DIAGNOSIS — I10 HYPERTENSION, BENIGN: ICD-10-CM

## 2025-03-28 DIAGNOSIS — E78.2 MIXED HYPERLIPIDEMIA: ICD-10-CM

## 2025-03-28 DIAGNOSIS — E66.01 CLASS 3 SEVERE OBESITY DUE TO EXCESS CALORIES WITH SERIOUS COMORBIDITY AND BODY MASS INDEX (BMI) OF 60.0 TO 69.9 IN ADULT: ICD-10-CM

## 2025-03-28 LAB
EXPIRATION DATE: ABNORMAL
HBA1C MFR BLD: 6.1 % (ref 4.5–5.7)
Lab: ABNORMAL

## 2025-03-28 NOTE — ASSESSMENT & PLAN NOTE
Patient's (Body mass index is 67.32 kg/m².) indicates that they are morbidly/severely obese (BMI > 40 or > 35 with obesity - related health condition) with health conditions that include diabetes mellitus . Weight is worsening. BMI  is above average; BMI management plan is completed. We discussed low calorie, low carb based diet program, portion control, and increasing exercise.

## 2025-03-28 NOTE — ASSESSMENT & PLAN NOTE
Hypertension is stable and controlled  Continue current treatment regimen.  Dietary sodium restriction.  Blood pressure will be reassessed in 3 months

## 2025-05-12 NOTE — PROGRESS NOTES
Subjective   Yoselin Gerard is a 40 y.o. female. Presents to [facility]    Chief Complaint   Patient presents with   • Ear Fullness       History of Present Illness  Patient reports of white spot on tongue. Onset was a few weeks ago. Patient noticed spot two to three days after oral sex. No current treatment.   Ear Fullness  Affected ear:  Left  Chronicity:  New  Onset:  More than 1 month ago  Progression since onset:  Coming and going  Frequency:  Intermittently  Fever:  No fever  Pain - numeric:  0/10  Pain severity:  No pain  Associated symptoms: tinnitus    Associated symptoms: no dizziness, no drainage and no hearing loss    Treatments tried:  Nothing  Abdominal Pain  Chronicity:  New  Onset:  1 to 4 weeks ago  Onset quality:  Sudden  Frequency:  Daily  Progression since onset:  Coming and going  Pain location:  Suprapubic region  Pain quality:  A sensation of fullness (pressure sensation)  Associated symptoms: no belching, no constipation, no diarrhea, no dysuria, no flatus, no frequency, no nausea and no vomiting    Aggravated by:  Nothing  Relieved by:  Being still  Treatments tried:  Nothing       Left ear feels like there is liquid in it.     Spot on tongue that she noticed it after oral sex about a month ago. It went away and then came back. Gone now. No recent breakdown.     Abdominal pain: Its lower abdomen. Feels tense. She can not lay on her stomach. (Tubes are tied).       I personally reviewed and updated the patient's allergies, medications, problem list, and past medical, surgical, social, and family history. I have reviewed and confirmed the accuracy of the History of Present Illness and Review of Symptoms as documented by the MA/LPN/RN. Josefa Skelton MD    Allergies:  Allergies   Allergen Reactions   • Penicillins Rash and Swelling   • Hydrocodone Other (See Comments)     Numbness       Social History:  Social History     Socioeconomic History   • Marital status:     Tobacco Use   • Smoking status: Never     Passive exposure: Never   • Smokeless tobacco: Never   Vaping Use   • Vaping status: Never Used   Substance and Sexual Activity   • Alcohol use: Not Currently   • Drug use: Not Currently   • Sexual activity: Defer       Family History:  Family History   Problem Relation Age of Onset   • Heart attack Mother    • Heart disease Mother    • Heart disease Maternal Grandfather        Past Medical History :  Patient Active Problem List   Diagnosis   • Anemia   • Anxiety   • Moderate episode of recurrent major depressive disorder   • Gastroesophageal reflux disease, esophagitis presence not specified   • Heartburn   • Hypertension, benign   • Moderate persistent asthma without complication   • Plantar fasciitis   • Palpitations   • Osteoarthritis   • Lumbosacral radiculopathy   • Lumbar disc herniation with radiculopathy   • Lower extremity edema   • Chronic right-sided low back pain with right-sided sciatica   • Excessive sweating   • Encounter for general adult medical examination with abnormal findings   • Hot flashes   • Migraine with aura and without status migrainosus, not intractable   • Class 3 severe obesity due to excess calories with serious comorbidity and body mass index (BMI) of 60.0 to 69.9 in adult   • Skin lesion of neck   • Mixed hyperlipidemia   • Pap smear of cervix with ASCUS, cannot exclude HGSIL   • Rh D negative blood type   • Chlamydia vaginitis/cervicitis   • Irregular menses   • Type 2 diabetes mellitus with hyperglycemia, without long-term current use of insulin   • Atypical chest pain   • Lung nodule   • Nevus   • Rash   • Aphthous ulcer of tongue   • Eustachian tube disorder       Medication List:    Current Outpatient Medications:   •  Blood Glucose Monitoring Suppl (FreeStyle Lite) device, Use 1 each Daily., Disp: 1 each, Rfl: 0  •  citalopram (CeleXA) 20 MG tablet, Take 1 tablet by mouth Daily., Disp: 90 tablet, Rfl: 3  •  clobetasol (TEMOVATE)  0.05 % external solution, Please see attached for detailed directions, Disp: , Rfl:   •  glucose blood test strip, 1 each by Other route Daily. Use as instructed, Disp: 100 each, Rfl: 3  •  glucose monitor monitoring kit, Use 1 each Daily., Disp: 1 each, Rfl: 0  •  ibuprofen (ADVIL,MOTRIN) 600 MG tablet, Take 1 tablet by mouth 3 times a day., Disp: , Rfl:   •  ketoconazole (NIZORAL) 2 % shampoo, Please see attached for detailed directions, Disp: , Rfl:   •  Lancets (freestyle) lancets, 1 each by Other route Daily., Disp: 100 each, Rfl: 3  •  losartan (COZAAR) 25 MG tablet, Take 0.5 tablets by mouth Daily., Disp: 45 tablet, Rfl: 3  •  metFORMIN (GLUCOPHAGE) 500 MG tablet, Take 1 tablet by mouth 2 (Two) Times a Day With Meals., Disp: 180 tablet, Rfl: 3  •  mupirocin (BACTROBAN) 2 % cream, Apply 1 Application topically to the appropriate area as directed 3 (Three) Times a Day., Disp: 1 each, Rfl: 0  •  rosuvastatin (CRESTOR) 20 MG tablet, Take 1 tablet by mouth Every Night., Disp: 90 tablet, Rfl: 3  •  topiramate (TOPAMAX) 25 MG tablet, Take 1 tablet by mouth Every 12 (Twelve) Hours., Disp: , Rfl:   •  valACYclovir (VALTREX) 1000 MG tablet, Take 1 tablet by mouth Every 12 (Twelve) Hours., Disp: 20 tablet, Rfl: 0  •  vitamin B-12 (CYANOCOBALAMIN) 1000 MCG tablet, Take 1 tablet by mouth Daily., Disp: , Rfl:   •  sulfamethoxazole-trimethoprim (BACTRIM DS,SEPTRA DS) 800-160 MG per tablet, Take 1 tablet by mouth 2 (Two) Times a Day., Disp: 20 tablet, Rfl: 0    Past Surgical History:  Past Surgical History:   Procedure Laterality Date   • CARPAL TUNNEL RELEASE     •  SECTION      3   • TUBAL ABDOMINAL LIGATION           Physical Exam:      Vital Signs:    Vitals:    25 1416   BP: 132/84   Pulse: 97   Resp: 20   Temp: 97.1 °F (36.2 °C)   SpO2: 95%        /84 (BP Location: Right arm, Patient Position: Sitting, Cuff Size: Adult)   Pulse 97   Temp 97.1 °F (36.2 °C) (Temporal)   Resp 20   Ht 157.5 cm  "(62.01\")   Wt (!) 164 kg (362 lb 6.4 oz)   LMP 04/23/2025 (Exact Date)   SpO2 95% Comment: ra  BMI 66.26 kg/m²     Wt Readings from Last 3 Encounters:   05/23/25 (!) 165 kg (363 lb 12.8 oz)   05/16/25 (!) 164 kg (362 lb 6.4 oz)   03/28/25 (!) 167 kg (368 lb 3.2 oz)       Result Review :   The following data was reviewed by: Josefa Skelton MD on 05/16/2025:  CMP          9/24/2024    11:36 5/14/2025    07:58   CMP   Glucose 118  119    BUN 9  9    Creatinine 0.66  0.70    EGFR 115  113    Sodium 141  141    Potassium 4.2  4.3    Chloride 102  103    Calcium 8.9  9.2    Total Protein 6.4  6.8    Albumin 3.8  3.9    Globulin 2.6  2.9    Total Bilirubin 0.4  0.4    Alkaline Phosphatase 68  72    AST (SGOT) 18  21    ALT (SGPT) 16  20    BUN/Creatinine Ratio 14  13      Lipid Panel          9/24/2024    11:36 5/14/2025    07:58   Lipid Panel   Total Cholesterol 191  210    Triglycerides 98  179    HDL Cholesterol 52  49    VLDL Cholesterol 18  32    LDL Cholesterol  121  129               Brief Urine Lab Results  (Last result in the past 365 days)        Color   Clarity   Blood   Leuk Est   Nitrite   Protein   CREAT   Urine HCG        05/16/25 1456 Yellow   Clear   Negative   Negative   Negative   Negative           05/16/25 1456               Negative                 Physical Exam  Vitals reviewed.   Constitutional:       Appearance: Normal appearance. She is well-developed.   HENT:      Head: Normocephalic and atraumatic.      Right Ear: External ear normal. No decreased hearing noted. No tenderness. No middle ear effusion. Tympanic membrane is not injected, erythematous, retracted or bulging.      Left Ear: External ear normal. No decreased hearing noted. No tenderness.  No middle ear effusion. Tympanic membrane is not injected, erythematous, retracted or bulging.      Nose: Nose normal. No rhinorrhea.      Mouth/Throat:      Tongue: No lesions.      Palate: No lesions.      Pharynx: No oropharyngeal exudate or " posterior oropharyngeal erythema.   Eyes:      General:         Right eye: No discharge.         Left eye: No discharge.   Cardiovascular:      Rate and Rhythm: Normal rate and regular rhythm.      Heart sounds: Normal heart sounds. No murmur heard.     No friction rub. No gallop.   Pulmonary:      Effort: Pulmonary effort is normal. No respiratory distress.      Breath sounds: Normal breath sounds. No wheezing or rales.   Abdominal:      General: Abdomen is flat. Bowel sounds are normal. There is no distension.      Palpations: Abdomen is soft. There is no mass.      Tenderness: There is no abdominal tenderness. There is no right CVA tenderness, left CVA tenderness, guarding or rebound.      Hernia: No hernia is present.   Lymphadenopathy:      Cervical: No cervical adenopathy.   Skin:     General: Skin is warm and dry.      Findings: No rash.   Neurological:      Mental Status: She is alert and oriented to person, place, and time.      Coordination: Coordination normal.      Gait: Gait normal.   Psychiatric:         Behavior: Behavior is cooperative.     Assessment and Plan:  Problems Addressed this Visit          Cardiac and Vasculature    Mixed hyperlipidemia     Lipid abnormalities are worsening    Plan:  Begin taking the following medication/s; crestor.      Discussed medication dosage, use, side effects, and goals of treatment in detail.    Counseled patient on lifestyle modifications to help control hyperlipidemia.     Patient Treatment Goals:   LDL goal is less than 70    Followup in 3 months.         Relevant Medications    rosuvastatin (CRESTOR) 20 MG tablet       ENT    Aphthous ulcer of tongue    Not there now  She had picture  Discussed diet changes.   Will check b12, folic acid and zinc         Relevant Orders    Folate (Completed)    Vitamin B12 (Completed)    Zinc (Completed)    Eustachian tube disorder    Cause of pain    Start antibiotic, nsaids and warm compresses            Endocrine and  Metabolic    Class 3 severe obesity due to excess calories with serious comorbidity and body mass index (BMI) of 60.0 to 69.9 in adult - Primary    Patient's (Body mass index is 66.26 kg/m².) indicates that they are morbidly/severely obese (BMI > 40 or > 35 with obesity - related health condition) with health conditions that include diabetes mellitus . Weight is improving with lifestyle modifications. BMI  is above average; BMI management plan is completed. We discussed low calorie, low carb based diet program, portion control, and increasing exercise.           Other Visit Diagnoses         Lower abdominal pain        UTI? Will send urine for culture. Start bactrim    Relevant Medications    sulfamethoxazole-trimethoprim (BACTRIM DS,SEPTRA DS) 800-160 MG per tablet    Other Relevant Orders    POC Urinalysis Dipstick, Multipro (Completed)    POC Pregnancy, Urine (Completed)    CBC & Differential (Completed)    Urine Culture - Urine, Urine, Random Void (Completed)          Diagnoses         Codes Comments      Class 3 severe obesity due to excess calories with serious comorbidity and body mass index (BMI) of 60.0 to 69.9 in adult    -  Primary ICD-10-CM: E66.813, E66.01, Z68.44  ICD-9-CM: 278.01, V85.44       Lower abdominal pain     ICD-10-CM: R10.30  ICD-9-CM: 789.09 UTI? Will send urine for culture. Start bactrim      Aphthous ulcer of tongue     ICD-10-CM: K12.0  ICD-9-CM: 528.2       Mixed hyperlipidemia     ICD-10-CM: E78.2  ICD-9-CM: 272.2       Disorder of both eustachian tubes     ICD-10-CM: H69.93  ICD-9-CM: 381.9                          An After Visit Summary and PPPS were given to the patient.       This document is intended for medical expert use only. Reading of this document by patients and/or patient's family without participating medical staff guidance may result in misinterpretation and unintended morbidity. Any interpretation of such data is the responsibility of the patient and/or family member  responsible for the patient in concert with their primary or specialist providers, not to be left for sources of online searches such as [employer], [employer] or similar queries. Relying on these approaches to knowledge may result in misinterpretation, misguided goals of care and even death should patients or family members try recommendations outside of the realm of professional medical care.

## 2025-05-15 LAB
ALBUMIN SERPL-MCNC: 3.9 G/DL (ref 3.9–4.9)
ALP SERPL-CCNC: 72 IU/L (ref 44–121)
ALT SERPL-CCNC: 20 IU/L (ref 0–32)
AST SERPL-CCNC: 21 IU/L (ref 0–40)
BILIRUB SERPL-MCNC: 0.4 MG/DL (ref 0–1.2)
BUN SERPL-MCNC: 9 MG/DL (ref 6–20)
BUN/CREAT SERPL: 13 (ref 9–23)
CALCIUM SERPL-MCNC: 9.2 MG/DL (ref 8.7–10.2)
CHLORIDE SERPL-SCNC: 103 MMOL/L (ref 96–106)
CHOLEST SERPL-MCNC: 210 MG/DL (ref 100–199)
CHOLEST/HDLC SERPL: 4.3 RATIO (ref 0–4.4)
CO2 SERPL-SCNC: 22 MMOL/L (ref 20–29)
CREAT SERPL-MCNC: 0.7 MG/DL (ref 0.57–1)
EGFRCR SERPLBLD CKD-EPI 2021: 113 ML/MIN/1.73
GLOBULIN SER CALC-MCNC: 2.9 G/DL (ref 1.5–4.5)
GLUCOSE SERPL-MCNC: 119 MG/DL (ref 70–99)
HDLC SERPL-MCNC: 49 MG/DL
LDLC SERPL CALC-MCNC: 129 MG/DL (ref 0–99)
POTASSIUM SERPL-SCNC: 4.3 MMOL/L (ref 3.5–5.2)
PROT SERPL-MCNC: 6.8 G/DL (ref 6–8.5)
SODIUM SERPL-SCNC: 141 MMOL/L (ref 134–144)
TRIGL SERPL-MCNC: 179 MG/DL (ref 0–149)
VLDLC SERPL CALC-MCNC: 32 MG/DL (ref 5–40)

## 2025-05-16 ENCOUNTER — OFFICE VISIT (OUTPATIENT)
Dept: FAMILY MEDICINE CLINIC | Facility: CLINIC | Age: 40
End: 2025-05-16
Payer: MEDICAID

## 2025-05-16 VITALS
WEIGHT: 293 LBS | RESPIRATION RATE: 20 BRPM | DIASTOLIC BLOOD PRESSURE: 84 MMHG | HEART RATE: 97 BPM | SYSTOLIC BLOOD PRESSURE: 132 MMHG | HEIGHT: 62 IN | OXYGEN SATURATION: 95 % | TEMPERATURE: 97.1 F | BODY MASS INDEX: 53.92 KG/M2

## 2025-05-16 DIAGNOSIS — R10.30 LOWER ABDOMINAL PAIN: ICD-10-CM

## 2025-05-16 DIAGNOSIS — H69.93 DISORDER OF BOTH EUSTACHIAN TUBES: ICD-10-CM

## 2025-05-16 DIAGNOSIS — E66.01 CLASS 3 SEVERE OBESITY DUE TO EXCESS CALORIES WITH SERIOUS COMORBIDITY AND BODY MASS INDEX (BMI) OF 60.0 TO 69.9 IN ADULT: Primary | ICD-10-CM

## 2025-05-16 DIAGNOSIS — E78.2 MIXED HYPERLIPIDEMIA: ICD-10-CM

## 2025-05-16 DIAGNOSIS — K12.0 APHTHOUS ULCER OF TONGUE: ICD-10-CM

## 2025-05-16 DIAGNOSIS — E66.813 CLASS 3 SEVERE OBESITY DUE TO EXCESS CALORIES WITH SERIOUS COMORBIDITY AND BODY MASS INDEX (BMI) OF 60.0 TO 69.9 IN ADULT: Primary | ICD-10-CM

## 2025-05-16 LAB
B-HCG UR QL: NEGATIVE
BILIRUB BLD-MCNC: NEGATIVE MG/DL
CLARITY, POC: CLEAR
COLOR UR: YELLOW
EXPIRATION DATE: NORMAL
GLUCOSE UR STRIP-MCNC: NEGATIVE MG/DL
INTERNAL NEGATIVE CONTROL: NEGATIVE
INTERNAL POSITIVE CONTROL: POSITIVE
KETONES UR QL: NEGATIVE
LEUKOCYTE EST, POC: NEGATIVE
Lab: NORMAL
NITRITE UR-MCNC: NEGATIVE MG/ML
PH UR: 6 [PH] (ref 5–8)
PROT UR STRIP-MCNC: NEGATIVE MG/DL
RBC # UR STRIP: NEGATIVE /UL
SP GR UR: 1.02 (ref 1–1.03)
UROBILINOGEN UR QL: NORMAL

## 2025-05-16 RX ORDER — ROSUVASTATIN CALCIUM 20 MG/1
20 TABLET, COATED ORAL NIGHTLY
Qty: 90 TABLET | Refills: 3 | Status: SHIPPED | OUTPATIENT
Start: 2025-05-16

## 2025-05-16 RX ORDER — SULFAMETHOXAZOLE AND TRIMETHOPRIM 800; 160 MG/1; MG/1
1 TABLET ORAL 2 TIMES DAILY
Qty: 20 TABLET | Refills: 0 | Status: SHIPPED | OUTPATIENT
Start: 2025-05-16

## 2025-05-17 LAB
BASOPHILS # BLD AUTO: 0 X10E3/UL (ref 0–0.2)
BASOPHILS NFR BLD AUTO: 1 %
EOSINOPHIL # BLD AUTO: 0.2 X10E3/UL (ref 0–0.4)
EOSINOPHIL NFR BLD AUTO: 2 %
ERYTHROCYTE [DISTWIDTH] IN BLOOD BY AUTOMATED COUNT: 14.9 % (ref 11.7–15.4)
FOLATE SERPL-MCNC: 4.2 NG/ML
HCT VFR BLD AUTO: 39 % (ref 34–46.6)
HGB BLD-MCNC: 11.6 G/DL (ref 11.1–15.9)
IMM GRANULOCYTES # BLD AUTO: 0 X10E3/UL (ref 0–0.1)
IMM GRANULOCYTES NFR BLD AUTO: 0 %
LYMPHOCYTES # BLD AUTO: 3.1 X10E3/UL (ref 0.7–3.1)
LYMPHOCYTES NFR BLD AUTO: 41 %
MCH RBC QN AUTO: 24.5 PG (ref 26.6–33)
MCHC RBC AUTO-ENTMCNC: 29.7 G/DL (ref 31.5–35.7)
MCV RBC AUTO: 82 FL (ref 79–97)
MONOCYTES # BLD AUTO: 0.5 X10E3/UL (ref 0.1–0.9)
MONOCYTES NFR BLD AUTO: 7 %
NEUTROPHILS # BLD AUTO: 3.8 X10E3/UL (ref 1.4–7)
NEUTROPHILS NFR BLD AUTO: 49 %
PLATELET # BLD AUTO: 297 X10E3/UL (ref 150–450)
RBC # BLD AUTO: 4.74 X10E6/UL (ref 3.77–5.28)
VIT B12 SERPL-MCNC: 455 PG/ML (ref 232–1245)
WBC # BLD AUTO: 7.6 X10E3/UL (ref 3.4–10.8)

## 2025-05-18 LAB
BACTERIA UR CULT: NORMAL
BACTERIA UR CULT: NORMAL

## 2025-05-20 LAB — ZINC SERPL-MCNC: 52 UG/DL (ref 44–115)

## 2025-05-23 ENCOUNTER — OFFICE VISIT (OUTPATIENT)
Dept: FAMILY MEDICINE CLINIC | Facility: CLINIC | Age: 40
End: 2025-05-23
Payer: MEDICAID

## 2025-05-23 VITALS
HEIGHT: 62 IN | TEMPERATURE: 97.8 F | OXYGEN SATURATION: 98 % | WEIGHT: 293 LBS | SYSTOLIC BLOOD PRESSURE: 124 MMHG | HEART RATE: 84 BPM | RESPIRATION RATE: 18 BRPM | BODY MASS INDEX: 53.92 KG/M2 | DIASTOLIC BLOOD PRESSURE: 76 MMHG

## 2025-05-23 DIAGNOSIS — H69.93 DISORDER OF BOTH EUSTACHIAN TUBES: ICD-10-CM

## 2025-05-23 DIAGNOSIS — H93.8X2 EAR FULLNESS, LEFT: ICD-10-CM

## 2025-05-23 DIAGNOSIS — K12.0 APHTHOUS ULCER OF TONGUE: Primary | ICD-10-CM

## 2025-05-23 DIAGNOSIS — E66.813 CLASS 3 SEVERE OBESITY DUE TO EXCESS CALORIES WITH SERIOUS COMORBIDITY AND BODY MASS INDEX (BMI) OF 60.0 TO 69.9 IN ADULT: ICD-10-CM

## 2025-05-23 DIAGNOSIS — E66.01 CLASS 3 SEVERE OBESITY DUE TO EXCESS CALORIES WITH SERIOUS COMORBIDITY AND BODY MASS INDEX (BMI) OF 60.0 TO 69.9 IN ADULT: ICD-10-CM

## 2025-05-23 PROBLEM — H69.90 EUSTACHIAN TUBE DISORDER: Status: ACTIVE | Noted: 2025-05-23

## 2025-05-23 PROCEDURE — 99213 OFFICE O/P EST LOW 20 MIN: CPT | Performed by: FAMILY MEDICINE

## 2025-05-23 PROCEDURE — 1126F AMNT PAIN NOTED NONE PRSNT: CPT | Performed by: FAMILY MEDICINE

## 2025-05-23 PROCEDURE — 3044F HG A1C LEVEL LT 7.0%: CPT | Performed by: FAMILY MEDICINE

## 2025-05-23 PROCEDURE — 3078F DIAST BP <80 MM HG: CPT | Performed by: FAMILY MEDICINE

## 2025-05-23 PROCEDURE — 1159F MED LIST DOCD IN RCRD: CPT | Performed by: FAMILY MEDICINE

## 2025-05-23 PROCEDURE — 1160F RVW MEDS BY RX/DR IN RCRD: CPT | Performed by: FAMILY MEDICINE

## 2025-05-23 PROCEDURE — 3074F SYST BP LT 130 MM HG: CPT | Performed by: FAMILY MEDICINE

## 2025-05-30 NOTE — ASSESSMENT & PLAN NOTE
Patient's (Body mass index is 66.26 kg/m².) indicates that they are morbidly/severely obese (BMI > 40 or > 35 with obesity - related health condition) with health conditions that include diabetes mellitus . Weight is improving with lifestyle modifications. BMI  is above average; BMI management plan is completed. We discussed low calorie, low carb based diet program, portion control, and increasing exercise.

## 2025-05-30 NOTE — ASSESSMENT & PLAN NOTE
Lipid abnormalities are worsening    Plan:  Begin taking the following medication/s; crestor.      Discussed medication dosage, use, side effects, and goals of treatment in detail.    Counseled patient on lifestyle modifications to help control hyperlipidemia.     Patient Treatment Goals:   LDL goal is less than 70    Followup in 3 months.

## 2025-05-31 NOTE — ASSESSMENT & PLAN NOTE
Patient's (Body mass index is 66.52 kg/m².) indicates that they are morbidly/severely obese (BMI > 40 or > 35 with obesity - related health condition) with health conditions that include diabetes mellitus . Weight is worsening. BMI  is above average; BMI management plan is completed. We discussed low calorie, low carb based diet program, portion control, and increasing exercise.